# Patient Record
Sex: MALE | Race: WHITE | NOT HISPANIC OR LATINO | Employment: UNEMPLOYED | URBAN - METROPOLITAN AREA
[De-identification: names, ages, dates, MRNs, and addresses within clinical notes are randomized per-mention and may not be internally consistent; named-entity substitution may affect disease eponyms.]

---

## 2020-09-30 ENCOUNTER — MEDICAL CORRESPONDENCE (OUTPATIENT)
Dept: HEALTH INFORMATION MANAGEMENT | Facility: CLINIC | Age: 45
End: 2020-09-30

## 2020-09-30 ENCOUNTER — TRANSFERRED RECORDS (OUTPATIENT)
Dept: HEALTH INFORMATION MANAGEMENT | Facility: CLINIC | Age: 45
End: 2020-09-30

## 2020-10-09 ENCOUNTER — TRANSCRIBE ORDERS (OUTPATIENT)
Dept: OTHER | Age: 45
End: 2020-10-09

## 2020-10-09 DIAGNOSIS — R05.3 CHRONIC COUGH: Primary | ICD-10-CM

## 2021-07-10 ENCOUNTER — HEALTH MAINTENANCE LETTER (OUTPATIENT)
Age: 46
End: 2021-07-10

## 2021-09-04 ENCOUNTER — HEALTH MAINTENANCE LETTER (OUTPATIENT)
Age: 46
End: 2021-09-04

## 2022-05-18 ENCOUNTER — TELEPHONE (OUTPATIENT)
Dept: PSYCHIATRY | Facility: CLINIC | Age: 47
End: 2022-05-18

## 2022-05-18 NOTE — TELEPHONE ENCOUNTER
What is the concern that needs to be addressed by a nurse? New trd intake, interested in ect    May a detailed message be left on voicemail? yes    Date of last office visit: na    Message routed to: [facility] new referrals

## 2022-06-16 ENCOUNTER — TELEPHONE (OUTPATIENT)
Dept: BEHAVIORAL HEALTH | Facility: CLINIC | Age: 47
End: 2022-06-16
Payer: MEDICARE

## 2022-06-16 NOTE — TELEPHONE ENCOUNTER
Visit Activities (Refresh list every visit): Phone Encounter     Anthony's mother called me and left a voicemail last week asking for a call back about possibel resources for mental health support in our system. I called her back and spoke to her for about 20 minutes. Explained resources within our system, provided support and encouragement for working with and for her son. He is not actively suicidal per her report, but she is hoping to get him into the [facility] or something similar for his depression. She may look into [place]. He actively sees his psychiatrist at [facility], and she says they will consult with him about [place], as well.    I did encouraged her to take him to the ED if anything becomes more difficult for him or SI appears. She will be back in touch with me if I can be of further help, but there are no further actions planned with me at this time.        Keaton Agudelo, GEOFFREY, [facility]

## 2022-08-06 ENCOUNTER — HEALTH MAINTENANCE LETTER (OUTPATIENT)
Age: 47
End: 2022-08-06

## 2022-10-07 ENCOUNTER — OFFICE VISIT (OUTPATIENT)
Dept: PSYCHIATRY | Facility: CLINIC | Age: 47
End: 2022-10-07
Payer: MEDICAID

## 2022-10-07 VITALS
TEMPERATURE: 98.7 F | HEIGHT: 74 IN | WEIGHT: 282.2 LBS | HEART RATE: 108 BPM | DIASTOLIC BLOOD PRESSURE: 86 MMHG | SYSTOLIC BLOOD PRESSURE: 122 MMHG | BODY MASS INDEX: 36.22 KG/M2

## 2022-10-07 DIAGNOSIS — F41.1 GENERALIZED ANXIETY DISORDER: ICD-10-CM

## 2022-10-07 DIAGNOSIS — G47.00 INSOMNIA, UNSPECIFIED TYPE: ICD-10-CM

## 2022-10-07 DIAGNOSIS — F10.21 ALCOHOL USE DISORDER, MODERATE, IN SUSTAINED REMISSION, DEPENDENCE (H): ICD-10-CM

## 2022-10-07 DIAGNOSIS — S06.9X3S TRAUMATIC BRAIN INJURY, WITH LOSS OF CONSCIOUSNESS OF 1 HOUR TO 5 HOURS 59 MINUTES, SEQUELA (H): ICD-10-CM

## 2022-10-07 DIAGNOSIS — F32.3 MAJOR DEPRESSIVE DISORDER, SINGLE EPISODE, SEVERE WITH PSYCHOTIC FEATURES (H): Primary | ICD-10-CM

## 2022-10-07 DIAGNOSIS — E55.9 VITAMIN D DEFICIENCY: ICD-10-CM

## 2022-10-07 ASSESSMENT — PATIENT HEALTH QUESTIONNAIRE - PHQ9: SUM OF ALL RESPONSES TO PHQ QUESTIONS 1-9: 18
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
## 2022-10-07 NOTE — PATIENT INSTRUCTIONS
Today's Recommendations:  - ECT:  Go to your primary care MD for an ECT pre-op exam ASAP.  You could take this print out to your doctor to show that you need : EKG, CBC, BMP, and  pre-op assessment. The pre-op assessment should include a physical examination, including neurological examination and evaluation of potential risks for ECT and anesthesia.    Unless your clinic is in the [facility] system (where we can see their notes), ask the doctor's office to fax the evaluation and lab results, including EKG tracing to [phone] to ATTN: Dr.Gamze Arriaza. When your doctor's office has sent the information call our team nurse at [phone] to make sure they were all received. As soon as we have medical clearance and insurance approval, we will schedule first ECT.    During ECT, you may call the ECT area [phone] between 7 AM and 2 PM on Monday, Wednesday and Friday about scheduling issues. At other times, you will have to leave a voice message which will be retrieved the next ECT day.       Useful Tips: 1- You may start using a calendar and notebook or apps on your smartphone to keep track of appointments, conversations, and other things you need to remember, as this will be more helpful as the ECT continues 2- An ECT journal to track your progress: Spend a few minutes writing down how you feel now before ECT and why you are doing ECT. Throughout the course of treatments (probably this will be easier on non-ECT days), write what changes you are noticing in your depression, daily activities or side effects of treatment. This can be useful later on in the ECT if you are having some memory loss day-to-day and can't recall how you used to feel. If you have trouble writing this, try recording a video on non-ECT days describing the above. 3- Improve Self- Care: eat healthily, keep a regular sleep schedule, exercise or other physical activity at least on non-ECT days as able. 4- Keep mentally active by reading, doing  crossword puzzles or other word games, play video or other games. You can improve your memory for events happening over the previous few days by regularly reviewing things that happened over that period of time, refreshing your memory.     Medication changes before and/or during ECT:     1: Do not take these medications the evening before and morning of (if applicable) ECT is planned: Clonazepam     For further information about ECT:     https://Good Seed.be/ZYCjz-6iEW4              https://www.[employer]ass Mobile[employer].com/watch?v=TAxt5ik6fdq      https://www.youJedox AGube.com/watch?v=JxeH75VA2Nu  https://www.isen-ect.org/educational-content  http://www.[facility].org/tests-procedures/electroconvulsive-therapy/basics/definition/prc-86982760              - Our recommendations almost always include some kind of cognitive-behavioral therapy (CBT) if you are not getting it already. Brain and nerve stimulation treatments work best when combined with certain psychotherapy methods. We make these recommendations because we strongly believe that, without the therapy piece, most people will not get better, or will have limited clinical benefit. It is often difficult or inconvenient to add therapy to an already busy schedule, but it is also necessary. Here is a link to find a CBT provider:  https://services.abct.org/i4a/memberDirectory/index.cfm?directory_id=3&bohfJL=1352    It is important to remember that, like all mental health treatments, our interventional therapies are not perfect. About one third of people will not feel better after treatment, and even when they work, they do not take away the symptoms entirely. If we have recommended something above, it means we think there is a better than even chance of it working, but things are never guaranteed. This is another reason we usually recommend CBT along with our advanced treatments -- it can address the symptoms that remain after the stimulation/ketamine treatment.       We are specifically a  "[place] and research [facility], and there are often research studies ongoing. Some of those are clinical trials of new brain stimulation treatments. Others are what we would call \"biomarker\" studies, where we ask you to participate in some kind of measurement while you are undergoing regular treatment. You may be called by one of our clinical research coordinators about these studies. If you do not want to be contacted, please let us know. (Being called does not mean you have to be in a study.) In some cases, a clinical trial is the only way to get access to an advanced treatment that is not covered by your insurance. Usually, we will talk about this in your visit if it is an option.    Outside of this specific clinic, you might also be interested in the \"Measurement Based Care\" research study that is being conducted throughout the [facility] Department of Psychiatry and Behavioral Sciences. This provides some additional testing that might be diagnostically helpful, although we are still trying to learn how best to use it.  More information about that is at: https://[email or web address].[email or web address]/behavioral-measurement-based-care-psychiatry-bmcp-poster    If you are interested in the study you can email, [email or web address].    Patient Education       General Information:  Our Treatment-Resistant Disorders/Interventional Psychiatry clinic is what is often called a \"consultation\" or \"tertiary care\" clinic. That means we do not see patients long-term for medication management or talk therapy. We offer thorough evaluations, recommendations about medications/therapies you may have not yet tried, and in some cases, brain stimulation or office-based treatments. If you are likely to benefit from one of those advanced treatments, we will have talked about it today. Once that treatment is complete, we will see you once or twice afterwards to check in, and then you will return to getting ongoing psychiatric care from whomever you were seeing " before you came for your evaluation with us. If for some reason you no longer have access to that clinician, we can help with a referral to our main [facility]. The only patients we see long-term are patients with implanted medical devices that require ongoing monitoring and programming.       While we are waiting to implement the recommendations you and I have discussed, you should know what to do if your symptoms worsen. A variety of crisis numbers/resources are available. They include:    CRISIS GENERAL NUMBERS   8-102-BJNAPEM ([phone])  OR  911     CRISIS INTERVENTION TEAM (CIT) - this is a POLICE UNIT, specially trained.  This website has information for all of [place]'s CITs. Lays out which areas have this team.  Http://cit.[facility]/citmap/[place].php  However, one can just call 911 and ask for this special team.   If police need to be called, DO ask for this team.    CRISIS MOBILE TEAMS  [and see end of this phrase*]  [facility] -COPE: 24hrs/7days:    [phone]  (Adults > 19yo)    [phone]  (Child   < 18yo)                                       FRONT DOOR (during the day could call)   [phone]    [facility] -[phone] (Adult)  [phone] (Child)     [facility] -[phone] (Adult and Child)     [facility] -[phone] ([employer] mobile crisis team; Adult and Child; 24hr)     [facility] -[phone] (Adult and Child)     CRISIS HOUSING  [facility]                           [address]              [phone]  [facility]                                [address]                       [phone]  [facility]                               [address]     [phone]   [facility]  [address]                [phone]    [place] EMERGENCY NUMBERS    Crisis  "Connection:                                [phone]  [facility]:     [phone]  Crisis Intervention:                                [phone] or [phone]   COPE: Mobile Team 24hrs/7days:    [phone]  (Adults > 19yo)                                                                            [phone]  (Child   < 16yo)  [facility]        [phone] 24/7 line and Mobile Team   [address] [place] [address]  DROP IN:  M-F: 8:00 am - 7:00 pm  Sat: 11:00 am - 3:00 pm   Sun: Closed     WALK-IN COUNSELING:  [facility]       [phone]    [address]:   M, W, F:  1:00-3:00PM    M-Th:  6:30-8:30PM    TRANS and LGBT  Call [employer] at [phone]. This service is staffed by trans people 24/7.   LGBT youth <24 contemplating suicide, call the lifecake [phone].    POISON CONTROL CENTER  [phone]    OR  go to nearest ER    CHILD  \"[facility] has a needs assessment team who will do an intake evaluation. Based on the results of the intake they will recommended inpatient admission, partial hospitalization, intensive outpatient or outpatient care. The number is [phone]. \"    CRISIS TEXT LINE  Http://www.crisistextline.org  FREE SUPPORT AT YOUR FINGERTIPS, 24/7  Crisis Text Line serves anyone, in any type of crisis, providing access to free, 24/7 support and information via the medium people already use and trust: text. Here s how it works:  1)  Text [phone] from anywhere in the [place], anytime, about any type of crisis.  2)  A live, trained Crisis Counselor receives the text and responds quickly.      The volunteer Crisis Counselor will help you move from a 'hot moment to a cool moment'    [facility] EMERGENCY NUMBERS    Crisis Connection:                                [phone]  [facility]:              [phone]  Crisis Intervention:                          " "      [phone] or [phone]   COPE: Mobile Team 24hrs/7days:    [phone]  (Adults > 17yo)                                                                            [phone]  (Child   < 18yo)  [facility]        [phone]  CALL 24 hours a day.  [address] [place] [address]  DROP IN:  M-F: 8:00 am - 7:00 pm  Sat: 11:00 am - 3:00 pm   Sun: Closed    WALK-IN COUNSELIN)  [facility]                                  [phone]        [facility], [address] [place] [address] Ave:                  M, W:      5:00-7:00PM       2)  [facility]                            [phone]        [facility], [address] [place] [facility] [place]                T, Th:      6:00-8:00PM    TRANS and LGBT  Call [employer] at [phone]. This service is staffed by trans people .   LGBT youth <24 contemplating suicide, call the [employer] [phone].    POISON CONTROL CENTER  [phone]    OR  go to nearest ER    CHILD  \"[facility] has a needs assessment team who will do an intake evaluation. Based on the results of the intake they will recommended inpatient admission, partial hospitalization, intensive outpatient or outpatient care. The number is [phone] or [phone]. \"    CRISIS TEXT LINE  Http://www.crisistextline.org  FREE SUPPORT AT YOUR FINGERTIPS,   Crisis Text Line serves anyone, in any type of crisis, providing access to free,  support and information via the medium people already use and trust: text. Here s how it works:  1)  Text [phone] from anywhere in the [place], anytime, about any type of crisis.  2)  A live, trained Crisis Counselor receives the text and responds quickly.      The volunteer Crisis Counselor will help you move from a 'hot moment to a cool moment'      * A Community Paramedic (CP) is an advanced paramedic that works to increase access to primary and preventive care and decrease use of emergency departments, which in turn " decreases health care costs. Among other things, CPs may play a key role in providing follow-up services after a hospital discharge to prevent hospital readmission. CPs can provide health assessments, chronic disease monitoring and education, medication management, immunizations and vaccinations, laboratory specimen collection, hospital discharge follow-up care and minor medical procedures. CPs work under the direction of an Ambulance Medical Director.

## 2023-01-23 ENCOUNTER — HOSPITAL ENCOUNTER (OUTPATIENT)
Facility: CLINIC | Age: 48
Setting detail: OBSERVATION
Discharge: STILL ADMITTED OR ADMINISTRATIVE | End: 2023-01-26
Attending: EMERGENCY MEDICINE | Admitting: NURSE PRACTITIONER
Payer: MEDICARE

## 2023-01-23 DIAGNOSIS — F41.9 ANXIETY: ICD-10-CM

## 2023-01-23 DIAGNOSIS — F25.0 SCHIZOAFFECTIVE DISORDER, BIPOLAR TYPE (H): ICD-10-CM

## 2023-01-23 DIAGNOSIS — F22 PARANOIA (H): ICD-10-CM

## 2023-01-23 LAB — SARS-COV-2 RNA RESP QL NAA+PROBE: NEGATIVE

## 2023-01-23 PROCEDURE — 99285 EMERGENCY DEPT VISIT HI MDM: CPT | Mod: 25

## 2023-01-23 PROCEDURE — U0005 INFEC AGEN DETEC AMPLI PROBE: HCPCS | Performed by: EMERGENCY MEDICINE

## 2023-01-23 PROCEDURE — 250N000013 HC RX MED GY IP 250 OP 250 PS 637: Performed by: NURSE PRACTITIONER

## 2023-01-23 PROCEDURE — G0378 HOSPITAL OBSERVATION PER HR: HCPCS

## 2023-01-23 PROCEDURE — 90791 PSYCH DIAGNOSTIC EVALUATION: CPT

## 2023-01-23 PROCEDURE — C9803 HOPD COVID-19 SPEC COLLECT: HCPCS

## 2023-01-23 PROCEDURE — 99223 1ST HOSP IP/OBS HIGH 75: CPT | Mod: 25 | Performed by: NURSE PRACTITIONER

## 2023-01-23 PROCEDURE — 93005 ELECTROCARDIOGRAM TRACING: CPT

## 2023-01-23 RX ORDER — ISOSORBIDE MONONITRATE 30 MG/1
1 TABLET, EXTENDED RELEASE ORAL DAILY
Status: ON HOLD | COMMUNITY
Start: 2022-11-23 | End: 2023-02-08

## 2023-01-23 RX ORDER — ZIPRASIDONE HYDROCHLORIDE 40 MG/1
40 CAPSULE ORAL
Status: DISCONTINUED | OUTPATIENT
Start: 2023-01-23 | End: 2023-01-26 | Stop reason: HOSPADM

## 2023-01-23 RX ORDER — LORATADINE 10 MG/1
10 TABLET ORAL DAILY
Status: DISCONTINUED | OUTPATIENT
Start: 2023-01-24 | End: 2023-01-26 | Stop reason: HOSPADM

## 2023-01-23 RX ORDER — ZIPRASIDONE HYDROCHLORIDE 80 MG/1
80 CAPSULE ORAL DAILY
Status: DISCONTINUED | OUTPATIENT
Start: 2023-01-24 | End: 2023-01-26 | Stop reason: HOSPADM

## 2023-01-23 RX ORDER — FLUTICASONE FUROATE AND VILANTEROL 100; 25 UG/1; UG/1
2 POWDER RESPIRATORY (INHALATION) 2 TIMES DAILY
Status: DISCONTINUED | OUTPATIENT
Start: 2023-01-24 | End: 2023-01-26 | Stop reason: HOSPADM

## 2023-01-23 RX ORDER — MIRTAZAPINE 15 MG/1
15 TABLET, FILM COATED ORAL AT BEDTIME
Status: DISCONTINUED | OUTPATIENT
Start: 2023-01-23 | End: 2023-01-26 | Stop reason: HOSPADM

## 2023-01-23 RX ORDER — LANOLIN ALCOHOL/MO/W.PET/CERES
3 CREAM (GRAM) TOPICAL
Status: DISCONTINUED | OUTPATIENT
Start: 2023-01-23 | End: 2023-01-26 | Stop reason: HOSPADM

## 2023-01-23 RX ORDER — ZIPRASIDONE HYDROCHLORIDE 80 MG/1
80 CAPSULE ORAL 2 TIMES DAILY
Status: DISCONTINUED | OUTPATIENT
Start: 2023-01-23 | End: 2023-01-23

## 2023-01-23 RX ORDER — FLUTICASONE FUROATE AND VILANTEROL TRIFENATATE 100; 25 UG/1; UG/1
1 POWDER RESPIRATORY (INHALATION) DAILY
Status: ON HOLD | COMMUNITY
Start: 2022-12-08 | End: 2023-02-08

## 2023-01-23 RX ORDER — METFORMIN HCL 500 MG
1500 TABLET, EXTENDED RELEASE 24 HR ORAL EVERY MORNING
Status: DISCONTINUED | OUTPATIENT
Start: 2023-01-24 | End: 2023-01-26 | Stop reason: HOSPADM

## 2023-01-23 RX ORDER — LANOLIN ALCOHOL/MO/W.PET/CERES
3 CREAM (GRAM) TOPICAL
COMMUNITY

## 2023-01-23 RX ORDER — HYDROXYZINE HYDROCHLORIDE 25 MG/1
25 TABLET, FILM COATED ORAL 3 TIMES DAILY PRN
Status: ON HOLD | COMMUNITY
Start: 2022-09-07 | End: 2023-02-08

## 2023-01-23 RX ORDER — LIDOCAINE 4 G/G
1 PATCH TOPICAL
Status: DISCONTINUED | OUTPATIENT
Start: 2023-01-24 | End: 2023-01-26 | Stop reason: HOSPADM

## 2023-01-23 RX ORDER — PROPRANOLOL HYDROCHLORIDE 20 MG/1
20 TABLET ORAL 3 TIMES DAILY
Status: ON HOLD | COMMUNITY
Start: 2023-01-20 | End: 2023-02-08

## 2023-01-23 RX ORDER — ZIPRASIDONE HYDROCHLORIDE 80 MG/1
80 CAPSULE ORAL 2 TIMES DAILY
Status: ON HOLD | COMMUNITY
Start: 2023-01-09 | End: 2023-02-08

## 2023-01-23 RX ORDER — LORAZEPAM 1 MG/1
1 TABLET ORAL EVERY 4 HOURS PRN
Status: DISCONTINUED | OUTPATIENT
Start: 2023-01-23 | End: 2023-01-26 | Stop reason: HOSPADM

## 2023-01-23 RX ORDER — HALOPERIDOL 5 MG/1
5 TABLET ORAL EVERY 6 HOURS PRN
Status: DISCONTINUED | OUTPATIENT
Start: 2023-01-23 | End: 2023-01-26 | Stop reason: HOSPADM

## 2023-01-23 RX ORDER — METFORMIN HCL 500 MG
1500 TABLET, EXTENDED RELEASE 24 HR ORAL EVERY MORNING
Status: ON HOLD | COMMUNITY
Start: 2022-09-28 | End: 2023-02-08

## 2023-01-23 RX ORDER — LITHIUM CARBONATE 300 MG/1
300 CAPSULE ORAL 2 TIMES DAILY WITH MEALS
Status: DISCONTINUED | OUTPATIENT
Start: 2023-01-23 | End: 2023-01-26 | Stop reason: HOSPADM

## 2023-01-23 RX ORDER — LAMOTRIGINE 100 MG/1
300 TABLET ORAL EVERY MORNING
Status: DISCONTINUED | OUTPATIENT
Start: 2023-01-24 | End: 2023-01-26 | Stop reason: HOSPADM

## 2023-01-23 RX ORDER — PROPRANOLOL HYDROCHLORIDE 10 MG/1
20 TABLET ORAL 3 TIMES DAILY
Status: DISCONTINUED | OUTPATIENT
Start: 2023-01-23 | End: 2023-01-26 | Stop reason: HOSPADM

## 2023-01-23 RX ORDER — MIRTAZAPINE 15 MG/1
15 TABLET, FILM COATED ORAL
Status: ON HOLD | COMMUNITY
Start: 2022-12-29 | End: 2023-02-08

## 2023-01-23 RX ORDER — ALBUTEROL SULFATE 90 UG/1
1-2 AEROSOL, METERED RESPIRATORY (INHALATION) EVERY 4 HOURS PRN
Status: ON HOLD | COMMUNITY
Start: 2022-04-20 | End: 2023-02-08

## 2023-01-23 RX ORDER — ISOSORBIDE MONONITRATE 30 MG/1
30 TABLET, EXTENDED RELEASE ORAL DAILY
Status: DISCONTINUED | OUTPATIENT
Start: 2023-01-24 | End: 2023-01-26 | Stop reason: HOSPADM

## 2023-01-23 RX ORDER — LIDOCAINE 50 MG/G
1 PATCH TOPICAL DAILY
COMMUNITY
Start: 2022-09-29

## 2023-01-23 RX ORDER — LAMOTRIGINE 150 MG/1
300 TABLET ORAL EVERY MORNING
Status: ON HOLD | COMMUNITY
Start: 2023-01-23 | End: 2023-02-08

## 2023-01-23 RX ORDER — HYDROXYZINE HYDROCHLORIDE 25 MG/1
25 TABLET, FILM COATED ORAL 3 TIMES DAILY PRN
Status: DISCONTINUED | OUTPATIENT
Start: 2023-01-23 | End: 2023-01-26 | Stop reason: HOSPADM

## 2023-01-23 RX ORDER — DILTIAZEM HYDROCHLORIDE 120 MG/1
120 CAPSULE, EXTENDED RELEASE ORAL DAILY
Status: ON HOLD | COMMUNITY
Start: 2022-05-10 | End: 2023-02-08

## 2023-01-23 RX ORDER — DILTIAZEM HYDROCHLORIDE 120 MG/1
120 CAPSULE, COATED, EXTENDED RELEASE ORAL DAILY
Status: DISCONTINUED | OUTPATIENT
Start: 2023-01-24 | End: 2023-01-26 | Stop reason: HOSPADM

## 2023-01-23 RX ORDER — MULTIVITAMIN
1 TABLET ORAL DAILY
COMMUNITY
Start: 2022-10-21

## 2023-01-23 RX ORDER — LORATADINE 10 MG/1
10 CAPSULE, LIQUID FILLED ORAL DAILY
Status: ON HOLD | COMMUNITY
End: 2023-02-08

## 2023-01-23 RX ORDER — ALBUTEROL SULFATE 90 UG/1
1-2 AEROSOL, METERED RESPIRATORY (INHALATION) EVERY 4 HOURS PRN
Status: DISCONTINUED | OUTPATIENT
Start: 2023-01-23 | End: 2023-01-26 | Stop reason: HOSPADM

## 2023-01-23 RX ADMIN — LORAZEPAM 1 MG: 1 TABLET ORAL at 21:48

## 2023-01-23 RX ADMIN — MIRTAZAPINE 15 MG: 15 TABLET, FILM COATED ORAL at 22:13

## 2023-01-23 RX ADMIN — HALOPERIDOL 5 MG: 5 TABLET ORAL at 21:48

## 2023-01-23 RX ADMIN — ZIPRASIDONE HYDROCHLORIDE 40 MG: 40 CAPSULE ORAL at 22:13

## 2023-01-23 RX ADMIN — PROPRANOLOL HYDROCHLORIDE 20 MG: 10 TABLET ORAL at 22:12

## 2023-01-23 ASSESSMENT — COLUMBIA-SUICIDE SEVERITY RATING SCALE - C-SSRS
TOTAL  NUMBER OF ACTUAL ATTEMPTS LIFETIME: 2
2. HAVE YOU ACTUALLY HAD ANY THOUGHTS OF KILLING YOURSELF?: YES
TOTAL  NUMBER OF ABORTED OR SELF INTERRUPTED ATTEMPTS LIFETIME: NO
4. HAVE YOU HAD THESE THOUGHTS AND HAD SOME INTENTION OF ACTING ON THEM?: YES
6. HAVE YOU EVER DONE ANYTHING, STARTED TO DO ANYTHING, OR PREPARED TO DO ANYTHING TO END YOUR LIFE?: NO
ATTEMPT PAST THREE MONTHS: NO
ATTEMPT LIFETIME: YES
TOTAL  NUMBER OF INTERRUPTED ATTEMPTS LIFETIME: NO
3. HAVE YOU BEEN THINKING ABOUT HOW YOU MIGHT KILL YOURSELF?: YES
1. IN THE PAST MONTH, HAVE YOU WISHED YOU WERE DEAD OR WISHED YOU COULD GO TO SLEEP AND NOT WAKE UP?: NO
5. HAVE YOU STARTED TO WORK OUT OR WORKED OUT THE DETAILS OF HOW TO KILL YOURSELF? DO YOU INTEND TO CARRY OUT THIS PLAN?: YES
1. HAVE YOU WISHED YOU WERE DEAD OR WISHED YOU COULD GO TO SLEEP AND NOT WAKE UP?: YES
REASONS FOR IDEATION PAST MONTH: COMPLETELY TO GET ATTENTION, REVENGE, OR A REACTION FROM OTHERS
5. HAVE YOU STARTED TO WORK OUT OR WORKED OUT THE DETAILS OF HOW TO KILL YOURSELF? DO YOU INTEND TO CARRY OUT THIS PLAN?: NO
2. HAVE YOU ACTUALLY HAD ANY THOUGHTS OF KILLING YOURSELF?: NO
4. HAVE YOU HAD THESE THOUGHTS AND HAD SOME INTENTION OF ACTING ON THEM?: NO
REASONS FOR IDEATION LIFETIME: COMPLETELY TO END OR STOP THE PAIN (YOU COULDN'T GO ON LIVING WITH THE PAIN OR HOW YOU WERE FEELING)

## 2023-01-23 ASSESSMENT — ACTIVITIES OF DAILY LIVING (ADL)
ADLS_ACUITY_SCORE: 35
ADLS_ACUITY_SCORE: 35

## 2023-01-23 ASSESSMENT — ENCOUNTER SYMPTOMS: NERVOUS/ANXIOUS: 1
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
## 2023-01-24 ENCOUNTER — TELEPHONE (OUTPATIENT)
Dept: BEHAVIORAL HEALTH | Facility: CLINIC | Age: 48
End: 2023-01-24
Payer: MEDICARE

## 2023-01-24 LAB
ALBUMIN SERPL BCG-MCNC: 3.9 G/DL (ref 3.5–5.2)
ALP SERPL-CCNC: 73 U/L (ref 40–129)
ALT SERPL W P-5'-P-CCNC: 17 U/L (ref 10–50)
AMPHETAMINES UR QL SCN: NORMAL
ANION GAP SERPL CALCULATED.3IONS-SCNC: 11 MMOL/L (ref 7–15)
AST SERPL W P-5'-P-CCNC: 14 U/L (ref 10–50)
ATRIAL RATE - MUSE: 77 BPM
BARBITURATES UR QL SCN: NORMAL
BENZODIAZ UR QL SCN: NORMAL
BILIRUB SERPL-MCNC: 0.5 MG/DL
BUN SERPL-MCNC: 11.1 MG/DL (ref 6–20)
BZE UR QL SCN: NORMAL
CALCIUM SERPL-MCNC: 8.6 MG/DL (ref 8.6–10)
CANNABINOIDS UR QL SCN: NORMAL
CHLORIDE SERPL-SCNC: 103 MMOL/L (ref 98–107)
CREAT SERPL-MCNC: 0.94 MG/DL (ref 0.67–1.17)
DEPRECATED HCO3 PLAS-SCNC: 27 MMOL/L (ref 22–29)
DIASTOLIC BLOOD PRESSURE - MUSE: NORMAL MMHG
ERYTHROCYTE [DISTWIDTH] IN BLOOD BY AUTOMATED COUNT: 14.1 % (ref 10–15)
GFR SERPL CREATININE-BSD FRML MDRD: >90 ML/MIN/1.73M2
GLUCOSE SERPL-MCNC: 94 MG/DL (ref 70–99)
HCT VFR BLD AUTO: 41.1 % (ref 40–53)
HGB BLD-MCNC: 14.2 G/DL (ref 13.3–17.7)
INTERPRETATION ECG - MUSE: NORMAL
MCH RBC QN AUTO: 29.9 PG (ref 26.5–33)
MCHC RBC AUTO-ENTMCNC: 34.5 G/DL (ref 31.5–36.5)
MCV RBC AUTO: 87 FL (ref 78–100)
OPIATES UR QL SCN: NORMAL
P AXIS - MUSE: 58 DEGREES
PCP QUAL URINE (ROCHE): NORMAL
PLATELET # BLD AUTO: 306 10E3/UL (ref 150–450)
POTASSIUM SERPL-SCNC: 3.7 MMOL/L (ref 3.4–5.3)
PR INTERVAL - MUSE: 160 MS
PROT SERPL-MCNC: 6.2 G/DL (ref 6.4–8.3)
QRS DURATION - MUSE: 88 MS
QT - MUSE: 418 MS
QTC - MUSE: 473 MS
R AXIS - MUSE: -2 DEGREES
RBC # BLD AUTO: 4.75 10E6/UL (ref 4.4–5.9)
SODIUM SERPL-SCNC: 141 MMOL/L (ref 136–145)
SYSTOLIC BLOOD PRESSURE - MUSE: NORMAL MMHG
T AXIS - MUSE: 25 DEGREES
T4 FREE SERPL-MCNC: 1.53 NG/DL (ref 0.9–1.7)
TSH SERPL DL<=0.005 MIU/L-ACNC: 4.4 UIU/ML (ref 0.3–4.2)
VENTRICULAR RATE- MUSE: 77 BPM
WBC # BLD AUTO: 8.7 10E3/UL (ref 4–11)

## 2023-01-24 PROCEDURE — 80053 COMPREHEN METABOLIC PANEL: CPT | Performed by: NURSE PRACTITIONER

## 2023-01-24 PROCEDURE — 84443 ASSAY THYROID STIM HORMONE: CPT | Performed by: NURSE PRACTITIONER

## 2023-01-24 PROCEDURE — 99233 SBSQ HOSP IP/OBS HIGH 50: CPT | Performed by: PSYCHIATRY & NEUROLOGY

## 2023-01-24 PROCEDURE — 85014 HEMATOCRIT: CPT | Performed by: NURSE PRACTITIONER

## 2023-01-24 PROCEDURE — 250N000013 HC RX MED GY IP 250 OP 250 PS 637: Performed by: NURSE PRACTITIONER

## 2023-01-24 PROCEDURE — G0378 HOSPITAL OBSERVATION PER HR: HCPCS

## 2023-01-24 PROCEDURE — 80307 DRUG TEST PRSMV CHEM ANLYZR: CPT | Performed by: EMERGENCY MEDICINE

## 2023-01-24 PROCEDURE — 84439 ASSAY OF FREE THYROXINE: CPT | Performed by: NURSE PRACTITIONER

## 2023-01-24 PROCEDURE — 36415 COLL VENOUS BLD VENIPUNCTURE: CPT | Performed by: NURSE PRACTITIONER

## 2023-01-24 RX ADMIN — LORAZEPAM 1 MG: 1 TABLET ORAL at 21:56

## 2023-01-24 RX ADMIN — PROPRANOLOL HYDROCHLORIDE 20 MG: 10 TABLET ORAL at 20:32

## 2023-01-24 RX ADMIN — HALOPERIDOL 5 MG: 5 TABLET ORAL at 21:56

## 2023-01-24 RX ADMIN — PROPRANOLOL HYDROCHLORIDE 20 MG: 10 TABLET ORAL at 09:21

## 2023-01-24 RX ADMIN — LITHIUM CARBONATE 300 MG: 300 CAPSULE, GELATIN COATED ORAL at 09:25

## 2023-01-24 RX ADMIN — ISOSORBIDE MONONITRATE 30 MG: 30 TABLET, EXTENDED RELEASE ORAL at 09:24

## 2023-01-24 RX ADMIN — LAMOTRIGINE 300 MG: 100 TABLET ORAL at 09:21

## 2023-01-24 RX ADMIN — METFORMIN ER 500 MG 1500 MG: 500 TABLET ORAL at 09:22

## 2023-01-24 RX ADMIN — LITHIUM CARBONATE 300 MG: 300 CAPSULE, GELATIN COATED ORAL at 19:11

## 2023-01-24 RX ADMIN — DILTIAZEM HYDROCHLORIDE 120 MG: 120 CAPSULE, COATED, EXTENDED RELEASE ORAL at 09:24

## 2023-01-24 RX ADMIN — LORATADINE 10 MG: 10 TABLET ORAL at 09:25

## 2023-01-24 RX ADMIN — MIRTAZAPINE 15 MG: 15 TABLET, FILM COATED ORAL at 20:32

## 2023-01-24 RX ADMIN — FLUTICASONE FUROATE AND VILANTEROL TRIFENATATE 2 PUFF: 100; 25 POWDER RESPIRATORY (INHALATION) at 20:31

## 2023-01-24 RX ADMIN — ZIPRASIDONE HCL 80 MG: 80 CAPSULE ORAL at 09:22

## 2023-01-24 RX ADMIN — FLUTICASONE FUROATE AND VILANTEROL TRIFENATATE 2 PUFF: 100; 25 POWDER RESPIRATORY (INHALATION) at 09:29

## 2023-01-24 RX ADMIN — ZIPRASIDONE HYDROCHLORIDE 40 MG: 40 CAPSULE ORAL at 19:11

## 2023-01-24 ASSESSMENT — COLUMBIA-SUICIDE SEVERITY RATING SCALE - C-SSRS
2. HAVE YOU ACTUALLY HAD ANY THOUGHTS OF KILLING YOURSELF?: YES
1. SINCE LAST CONTACT, HAVE YOU WISHED YOU WERE DEAD OR WISHED YOU COULD GO TO SLEEP AND NOT WAKE UP?: YES
6. HAVE YOU EVER DONE ANYTHING, STARTED TO DO ANYTHING, OR PREPARED TO DO ANYTHING TO END YOUR LIFE?: NO
REASONS FOR IDEATION SINCE LAST CONTACT: COMPLETELY TO END OR STOP THE PAIN (YOU COULDN'T GO ON LIVING WITH THE PAIN OR HOW YOU WERE FEELING)
TOTAL  NUMBER OF INTERRUPTED ATTEMPTS SINCE LAST CONTACT: NO
5. HAVE YOU STARTED TO WORK OUT OR WORKED OUT THE DETAILS OF HOW TO KILL YOURSELF? DO YOU INTEND TO CARRY OUT THIS PLAN?: NO
SUICIDE, SINCE LAST CONTACT: NO
ATTEMPT SINCE LAST CONTACT: NO
TOTAL  NUMBER OF ABORTED OR SELF INTERRUPTED ATTEMPTS SINCE LAST CONTACT: NO

## 2023-01-24 ASSESSMENT — ACTIVITIES OF DAILY LIVING (ADL)
ADLS_ACUITY_SCORE: 35

## 2023-01-24 NOTE — ED PROVIDER NOTES
EmPATH Unit - Psychiatric Consultation  [facility] Emergency Department    Tim Kelley MRN: [id number]   Age: 47 year old YOB: 1975     History     Chief Complaint   Patient presents with     Paranoid     Psychiatric Evaluation     HPI  Tim Kelley is a 47 year old male with history notable for schizoaffective disorder who is currently under observation status on the EmPATH unit for treatment of worsening psychosis involving paranoid delusions.  He was initially evaluated by CHILO Azevedo who lowered the dose of Geodon, suspecting EPS at the current dose after it was introduced during his last hospital stay.  Lithium was also added to address mood instability.  The patient was entered to observation status and is being reassessed today.  Overnight, there were no acute issues.    On examination today, the patient reports that the intensity of his anxiety remains high although slightly improved from yesterday.  He continues to harbor paranoid delusions that organizations are conspiring against him however the intensity of those thoughts and the associated anxiety he experiences is slightly less.  He is tolerating lithium without side effects.  He has had a dose of Haldol and gained benefit.  He slept well last night.  He is fearful of returning home today while endorsing passive suicidal thoughts if this level of symptom intensity were to persist.  He is seeking help to lessen the symptoms by pursuing inpatient psychiatric hospitalization again.    Past Medical History  Past Medical History:   Diagnosis Date     BIPOLAR DISORDER, DEPRESSIVE PHASE 8/6/2007     ED (erectile dysfunction)      Impulse control disorder, unspecified      Migraine, unspecified, without mention of intractable migraine without mention of status migrainosus      Other convulsions     EARLY CHILDHOOD     Past Surgical History:   Procedure Laterality Date     [facility] NONSPECIFIC PROCEDURE      HAND SUGERIES  "    albuterol (PROAIR HFA/PROVENTIL HFA/VENTOLIN HFA) 108 (90 Base) MCG/ACT inhaler  BREO ELLIPTA 100-25 MCG/ACT inhaler  diltiazem ER (DILT-XR) 120 MG 24 hr capsule  hydrOXYzine (ATARAX) 25 MG tablet  isosorbide mononitrate (IMDUR) 30 MG 24 hr tablet  lamoTRIgine (LAMICTAL) 150 MG tablet  lidocaine (LIDODERM) 5 % patch  loratadine 10 MG capsule  melatonin 3 MG tablet  metFORMIN (GLUCOPHAGE XR) 500 MG 24 hr tablet  mirtazapine (REMERON) 15 MG tablet  Multiple Vitamin (ONE-A-DAY ESSENTIAL) TABS  propranolol (INDERAL) 20 MG tablet  ziprasidone (GEODON) 80 MG capsule      Allergies   Allergen Reactions     No Known Drug Allergies      Family History  Family History   Problem Relation Age of Onset     Hypertension Paternal Grandmother      Hypertension Paternal Uncle      Hypertension Paternal Uncle      Depression Maternal Grandfather      Heart Disease Paternal Grandfather         several heart surgeries     C.A.D. Father         PTCA with 2 stents     Lipids Father      Social History   Social History     Tobacco Use     Smoking status: Never     Smokeless tobacco: Never   Substance Use Topics     Alcohol use: Yes     Comment: 2-3 per week     Drug use: No          Review of Systems  A medically appropriate review of systems was performed with pertinent positives and negatives noted in the HPI, and all other systems negative.    Physical Examination   BP: (!) 158/95  Pulse: 82  Temp: 98.5  F (36.9  C)  Resp: 18  Height: 188 cm (6' 2\")  Weight: 114.8 kg (253 lb)  SpO2: 97 %    Physical Exam  General: Appears stated age.   Neuro: Alert and fully oriented. Extremities appear to demonstrate normal strength on visual inspection.   Integumentary/Skin: no rash visualized, normal color    Psychiatric Examination   Appearance: awake, alert  Attitude:  cooperative  Eye Contact:  fair  Mood:  anxious  Affect:  tense and anxious  Speech:  clear, coherent  Psychomotor Behavior:  tremor observed   Thought Process:  " linear  Associations:  no loose associations  Thought Content:  passive suicidal ideation present and paranoid delusions are evident  Insight:  limited  Judgement:  fair  Oriented to:  time, person, and place  Attention Span and Concentration:  fair  Recent and Remote Memory:  fair  Language: able to name/identify objects without impairment  Fund of Knowledge: intact with awareness of current and past events    ED Course        Labs Ordered and Resulted from Time of ED Arrival to Time of ED Departure   COMPREHENSIVE METABOLIC PANEL - Abnormal       Result Value    Sodium 141      Potassium 3.7      Chloride 103      Carbon Dioxide (CO2) 27      Anion Gap 11      Urea Nitrogen 11.1      Creatinine 0.94      Calcium 8.6      Glucose 94      Alkaline Phosphatase 73      AST 14      ALT 17      Protein Total 6.2 (*)     Albumin 3.9      Bilirubin Total 0.5      GFR Estimate >90     TSH WITH FREE T4 REFLEX - Abnormal    TSH 4.40 (*)    COVID-19 VIRUS (CORONAVIRUS) BY PCR - Normal    SARS CoV2 PCR Negative     CBC WITH PLATELETS - Normal    WBC Count 8.7      RBC Count 4.75      Hemoglobin 14.2      Hematocrit 41.1      MCV 87      MCH 29.9      MCHC 34.5      RDW 14.1      Platelet Count 306     T4 FREE - Normal    Free T4 1.53     DRUG ABUSE SCREEN 77 URINE (FL, RH, SH) - Normal    Amphetamines Urine Screen Negative      Barbituates Urine Screen Negative      Benzodiazepine Urine Screen Negative      Cannabinoids Urine Screen Negative      Opiates Urine Screen Negative      PCP Urine Screen Negative      Cocaine Urine Screen Negative         Assessments & Plan (with Medical Decision Making)   Patient presenting with worsening psychosis involving paranoid delusions leading to a heightened state of anxiety.  His presentation is thought to have been complicated by side effects to Geodon, leading to a dose reduction of 40 mg.  Lithium was recently added while awaiting therapeutic response.  Given the high intensity of  psychosis and associated distress which may increase his suicide risk, we will proceed with admission to inpatient psychiatry.  Nursing notes reviewed noting no acute issues.     I have reviewed the assessment completed by the [facility].     Preliminary diagnosis:    ICD-10-CM    1. Paranoia (H)  F22       2. Schizoaffective disorder, bipolar type (H)  F25.0       3. Anxiety  F41.9            Treatment Plan:  -Continue the lower dose of Geodon at 120 mg daily in 2 divided doses targeting reduction of psychosis  -Continue Haldol as needed for acute worsening of psychosis until the scheduled medications provide adequate relief of symptoms.  -Continue lithium 300 mg twice a day for mood stabilization.  Anticipate checking a serum level in about 5 days to ensure safe and therapeutic dosing.  -Urine drug screen was reviewed and negative for illicit substances.  -Given the high intensity of psychosis and associated distress which may increase his suicide risk, we will proceed with admission to inpatient psychiatry under voluntary status.     --  Marcos Heard MD   [facility] EMERGENCY DEPT  EmPATH Unit  1/23/2023      Marcos Heard MD  01/24/23 1129
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
## 2023-01-24 NOTE — ED NOTES
"47 year old male received to [facility] in a very agitated-delusional state. Pressured and tense as he reports that \"hackers\" that he has \"pissed off\" are torturing him by putting energy into him from a \"Satellight\". Reports having bursts of heat all over his body, heart racing and penis retraction.\"They are going to kill me\" He reports that he is here have help to make amends with hackers so they will stop torturing him. \" Everyone thinks I am crazy but I am not-I need someone to believe me\"  Very anxious and desperate. Diaphoretic as he talks about his distress. Denies suicidal ideation but reports that at times he wishes he would die so he could escape the torture. Blood pressure and pulse stable. Offered support and reassurance. Offered lunch box and snacks. Will meet with LMHP and psychiatric provider.    Nursing and risk assessments completed.  Assessments reviewed with LMHP and physician. Video monitoring in progress, patient informed.  Admission information reviewed with patient. Patient given a tour of [facility] and instructions on using the facility. Questions regarding [facility] addressed. Pt search completed.  "
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
## 2023-01-24 NOTE — TELEPHONE ENCOUNTER
S: MANJULA Orozco  Elmer calling at 11:20 AM  about a 47 year old/Male presenting to ED from group home for evaluation of paranoid delusions and severe anxiety, with Increased paranoia, delusionalthinking that hackers are spying on him, not eating, sleeping and SI if no one can help him with hackers. Pt has a Hx of schizoaffective disorder, bipolar type, paranoid delusions, hx of stimulant use disorder, HTN, migraines.        B: Pt arrived via Group Home Staff . Presenting problem, stressors: delusions, paranoia, not sleeping or eating.    Pt affect in ED: Anxious   Pt Dx: Bipolar Disorder and Schizoaffective Disorder  Previous [facility] hx? Yes: [facility] in December    Pt endorses SI, no plan   Hx of suicide attempt? No  Pt denies SIB  Pt denies HI   Pt denies hallucinations .     Hx of aggression/violence, sexual offences, legal concerns, or Epic care plan? describe: No  Current concerns for aggression this visit? No  Does pt have a history of Civil Commitment? No  Is Pt their own guardian? Yes    Pt is prescribed medication. Is patient medication compliant? Yes  Pt endorses OP services: Psychiatrist, Medication Management, Therapist and   CD concerns: Hx of Amphetamines use, last use in december, 2022.  Acute or chronic medical concerns: None  Does Pt present with specific needs, assistive devices, or exclusionary criteria? None      Pt is ambulatory  Pt is able to perform ADLs independently      A: Pt to be reviewed for [facility] admission. Pt is Voluntary  Preferred placement: [facility]    COVID:Not yet ordered, Intake to request lab   Utox: Negative   CMP: WNL  CBC: WNL  HCG: N/A    R: Patient cleared and ready for behavioral bed placement: Yes  Pt placed on IP worklist? Yes

## 2023-01-24 NOTE — ED PROVIDER NOTES
"  History     Chief Complaint:  Paranoid and Psychiatric Evaluation       HPI   Tim Kelley is a 47 year old male with a history of paranoia with psychosis, schizoaffective disorder, and bipolar disorder who presents with paranoid and psychiatric evaluation. The patient reports that he upset a community of hackers that are now sending \"harmful energy\" to his body. He states that his phone was hacked in 2015 and he was unable to use any of his applications, and he sent himself an email that said \"You took my wedding pictures away from me. When I find you I'm going to fuck your daughter and wife in front of you, and then I'm going to slit your throat\" assuming that the hackers would read it. He went to the [employer] department, and the  said that his life may not be the same after sending that email. He states people began showing up and standing at the front of his house in 2016, and the hackers send harmful energy to him that causes heart racing, his penis turns white and goes inside of him, his testicles shrivel up, pain in his joints, and his entire body vibrates. The vibrations cause the house to creak and books to fall off the shelf. This used to happen once a month, but it has been happening every day recently. He feels safer when he is in the hospital, but states the hackers are still able to put harmful energy in him. He has been hospitalized multiple times, but no one believes that he is truly in trouble. He thinks he needs the help of the [employer] cyber crime unit. No current symptoms.     Review of External Notes: reviewed recent psych admission to [facility] 12/29/2022     ROS:  Review of Systems   Psychiatric/Behavioral: The patient is nervous/anxious.    All other systems reviewed and are negative.      Allergies:  No known drug allergies     Medications:    Albuterol inhaler  Imdur  Finasteride  Mirtazapine  Hydroxyzine  Lamotrigine  Propranolol  Geodon  Metformin " "    Past Medical History:   Bipolar disorder  Impulse control disorder  Migraines  Stimulant use disorder  Generalized anxiety disorder  Schizoaffective disorder  Psychoactive substance-induced organic delirium  Paranoia with psychosis  Hypertension  ADHD  Pathological gambling  Adrenal mass    Past Surgical History:    Hand surgery    Family History:    Father- CAD, hyperlipidemia, bipolar disorder  Mother- depression  Sister- borderline personality disorder    Social History:  The patient presents via private vehicle  PCP: Lonny Wright     Physical Exam     Patient Vitals for the past 24 hrs:   BP Temp Temp src Pulse Resp SpO2 Height Weight   01/23/23 1948 125/87 100  F (37.8  C) Oral 88 -- 97 % -- --   01/23/23 1947 -- -- -- -- -- -- 1.88 m (6' 2\") 114.8 kg (253 lb)   01/23/23 1600 (!) 158/95 98.5  F (36.9  C) Oral 82 18 97 % -- --        Physical Exam  General: Alert, No distress. Nontoxic appearance  Head: No signs of trauma.   Mouth/Throat: Oropharynx moist.   Eyes: Conjunctivae are normal. Pupils are equal..   Neck: Normal range of motion.    CV: Appears well perfused.  Resp:No respiratory distress.   MSK: Normal range of motion. No obvious deformity.   Neuro: The patient is alert and interactive. NORMAN. Speech normal. GCS 15  Skin: No lesion or sign of trauma noted.   Psych: normal mood and affect. behavior is normal.     Emergency Department Course     Laboratory:  Labs Ordered and Resulted from Time of ED Arrival to Time of ED Departure   COVID-19 VIRUS (CORONAVIRUS) BY PCR - Normal       Result Value    SARS CoV2 PCR Negative        Emergency Department Course & Assessments:    PSS-3    Date and Time Over the past 2 weeks have you felt down, depressed, or hopeless? Over the past 2 weeks have you had thoughts of killing yourself? Have you ever attempted to kill yourself? When did this last happen? User   01/23/23 1607 yes no yes more than 6 months ago SH              Suicide assessment completed by " mental health (D.E.C., LCSW, etc.)    Interventions:  Medications - No data to display     Social Determinants of Health affecting care:  Stress/Adjustment Disorders    Assessments:  1930 I obtained history and examined the patient as noted above. Plan for Em[facility].    Disposition:  The patient was transferred to [facility].     Impression & Plan      Medical Decision Making:  This patient is presenting to the ER with recurrent paranoid thoughts of people trying to harm him.  He was recently admitted to [facility] for similar thoughts.  No apparent organic causes of these thoughts is apparent.  The patient is medically cleared for transfer to the empath unit for further evaluation.    Diagnosis:    ICD-10-CM    1. Paranoia (H)  F22          Scribe Disclosure:  I, Dominga Del Rio, am serving as a scribe at 7:29 PM on 1/23/2023 to document services personally performed by Hollis Roberts MD based on my observations and the provider's statements to me.     1/23/2023   Hollis Roberts MD Joing, Todd Roger, MD  01/24/23 0417

## 2023-01-24 NOTE — ED NOTES
"[facility] Crisis Reassessment      Tim Kelley was reassessed for the following reasons: [facility] Pt was first seen on 01/23/2023 by ODALIS Morse; see the initial assessment note for details.      Patient Presentation    Initial ED presentation details:   Writer met with patient in consult room B for mental health crisis assessment. Patient came to this interview willingly. He was alert & oriented. Patient appeared physically agitated and spoke with loud, hyperverbal speech. Patient got quite worked up as he spoke about being frightened for his safety due to hackers ruining his life, however he responded well to redirection, encouragement to lower his voice, and calm his body. Patient reported that in 2016 his [employer] ID and all online accounts were hacked. Patient sent an e-mail to himself around this time, which he says was intended for and read by the hackers, threatening to rape the hackers' daughters and wives and kill the hacker. Patient is remorseful over sending this email and requests help from hospital staff to \"make amends\" with the hackers, as ever since this time they have been trying to kill him. Patient states several times during this interview how he is sure writer does not believe his story, as no one ever believes him because it sounds so outrageous.      Patient states his current symptoms became much worse on 12/22/2022 but he does not report any specific trigger on this date. Patient explained his symptoms as energy on his body becoming out of control and painful and feeling very hot, with a prickly sensation like fingers are touching him. He also states his heart races, all saliva leaves his mouth, and his penis and testicles completely retract into his body. Patient is quite animated as he discusses his symptoms. Patient denies any thoughts of suicide or of harming himself, however he mentions to writer \"I don't know if I'm going to kill myself\" due to " "being afraid for his life. Patient denies thoughts of harming other people, auditory hallucinations, or visual hallucinations. Patient reports that his sleep is poor and he is mostly tossing and turning at night. His appetite is very poor and he is frequently sick to his stomach \"because I'm so scared and nervous.\" Patient stated to writer \"I need safety, I don't know where to go, I don't know if I'm going to kill myself, I need to talk to the FBI, I need to get across to these people how sorry I am.\" When asked how the hospital can help him, patient replied \"put me on meds that will help calm me down, keep me safe from killing myself, and connect me with someone from the FBI.\" Writer informed patient that our goal at the hospital is to treat his mental health and that staff do not get involved in patients' personal or legal affairs, to which patient verbalized understanding. Patient is requesting to be admitted to an inpatient mental health unit.     Current patient presentation:   Pt presented to the reassessment willingly and was cooperative with Writer. When prompted by Writer to discuss how Pt is doing, Pt stated 'the energy hurts the fuck out of me.\" When Writer asked to further explain the energy, Pt shared he continues to be followed, tracked, and monitored by the \"hackers\" while on the EmPATH unit, yet acknowledges a decrease in severity. Pt continues to endorse feeling \"scared, very scared\" about his safety if he were to discharge and feels as though he requires inpatient mental health services. Pt endorses recent symptoms of lack of appetite, sleep disturbance, and isolating from others. When prompted to discuss suicidal ideation, Pt stated if he were to discharge, he may have to kill himself due to concern of his safety. Pt denies any plan, yet states he is running out of things to do in order to get away from the hackers. Pt reports while being on the Em[facility] unit, he has been able to eat and sleep, yet " "contributes it to medication administration, and believes the hackers will be waiting for him once he leaves the unit.    Changes observed since initial assessment: Decrease in somatic symptoms, as Pt reports \"less energy\" that is hurting him, yet reports an increase in suicidal ideation in the form of if he were to discharge, he would think about killing himself due to concern for his safety.       Risk of Harm    [place] Suicide Severity Rating Scale Full Clinical Version: 01/23/2023  Suicidal Ideation  1. Wish to be Dead (Lifetime): Yes  1. Wish to be Dead (Past 1 Month): No  2. Non-Specific Active Suicidal Thoughts (Lifetime): Yes  2. Non-Specific Active Suicidal Thoughts (Past 1 Month): No  3. Active Suicidal Ideation with any Methods (Not Plan) Without Intent to Act (Lifetime): Yes  3. Active Suicidal Ideation with any Methods (Not Plan) Without Intent to Act (Past 1 Month): No  4. Active Suicidal Ideation with Some Intent to Act, Without Specific Plan (Lifetime): Yes  4. Active Suicidal Ideation with Some Intent to Act, Without Specific Plan (Past 1 Month): No  5. Active Suicidal Ideation with Specific Plan and Intent (Lifetime): Yes  5. Active Suicidal Ideation with Specific Plan and Intent (Past 1 Month): No  Intensity of Ideation  Most Severe Ideation Rating (Lifetime): 5  Description of Most Severe Ideation (Lifetime): 5  Most Severe Ideation Rating (Past 1 Month): 1  Description of Most Severe Ideation (Past 1 Month): 1  Frequency (Lifetime): Many times each day  Frequency (Past 1 Month): Less than once a week  Duration (Lifetime): More than 8 hours/persistent or continuous  Duration (Past 1 Month): Fleeting, few seconds or minutes  Controllability (Lifetime): Unable to control thoughts  Controllability (Past 1 Month): Easily able to control thoughts  Deterrents (Lifetime): Deterrents definitely did not stop you  Deterrents (Past 1 Month): Deterrents definitely stopped you from attempting " suicide  Reasons for Ideation (Lifetime): Completely to end or stop the pain (You couldn't go on living with the pain or how you were feeling)  Reasons for Ideation (Past 1 Month): Completely to get attention, revenge, or a reaction from others  Suicidal Behavior  Actual Attempt (Lifetime): Yes  Total Number of Actual Attempts (Lifetime): 2  Actual Attempt Description (Lifetime): 2007 ingestion lead, 2017 ingestion Tylenol  Actual Attempt (Past 3 Months): No  Has subject engaged in non-suicidal self-injurious behavior? (Lifetime): No  Interrupted Attempts (Lifetime): No  Aborted or Self-Interrupted Attempt (Lifetime): No  Preparatory Acts or Behavior (Lifetime): No  C-SSRS Risk (Lifetime/Recent)  Calculated C-SSRS Risk Score (Lifetime/Recent): Moderate Risk    [place] Suicide Severity Rating Scale Since Last Contact: 01/24/2023  Suicidal Ideation (Since Last Contact)  1. Wish to be Dead (Since Last Contact): Yes  2. Non-Specific Active Suicidal Thoughts (Since Last Contact): Yes  3. Active Suicidal Ideation with any Methods (Not Plan) Without Intent to Act (Since Last Contact): No  4. Active Suicidal Ideation with Some Intent to Act, Without Specific Plan (Since Last Contact): No  5. Active Suicidal Ideation with Specific Plan and Intent (Since Last Contact): No  Suicidal Behavior (Since Last Contact)  Actual Attempt (Since Last Contact): No  Has subject engaged in non-suicidal self-injurious behavior? (Since Last Contact): No  Interrupted Attempts (Since Last Contact): No  Aborted or Self-Interrupted Attempt (Since Last Contact): No  Preparatory Acts or Behavior (Since Last Contact): No  Suicide (Since Last Contact): No  Actual/Potential Lethality (Most Lethal Attempt)  Most Lethal Attempt Date:  (2017 ingestion of Tylenol)  C-SSRS Risk (Since Last Contact)  Calculated C-SSRS Risk Score (Since Last Contact): Low Risk    Validity of evaluation is impacted by presenting factors during interview psychosis, paranoia.    Comments regarding subjective versus objective responses to [place] tool: na  Environmental or Psychosocial Events: loss of relationship due to divorce/separation, helplessness/hopelessness, impulsivity/recklessness and neither working nor attending school  Chronic Risk Factors: history of suicide attempts (x2), history of psychiatric hospitalization, chronic and ongoing sleep difficulties and serious, persistent mental illness   Warning Signs: hopelessness, feeling trapped, like there is no way out, anxiety, agitation, unable to sleep, sleeping all the time and recent discharges from emergency department or inpatient psychiatric care  Protective Factors: strong bond to family unit, community support, or employment, lives in a responsibly safe and stable environment, able to access care without barriers, supportive ongoing medical and mental health care relationships and help seeking  Interpretation of Risk Scoring, Risk Mitigation Interventions and Safety Plan:  It is the recommendation of this clinician that the patient be admitted to inpatient mental health care for further treatment, stabilization and safety. Attempts at managing mental health symptoms and maintaining safety at a lower level of care have been unsuccessful. Patient s current mental health symptoms are requiring a secure setting due to: pt is displaying symptoms of psychosis that are impairing ability to function safely in the community.       Does the patient have thoughts of harming others? No    Mental Status Exam   Affect: Blunted   Appearance: Appropriate    Attention Span/Concentration: Attentive?    Eye Contact: Variable   Fund of Knowledge: Appropriate    Language /Speech Content: Fluent   Language /Speech Volume: Normal    Language /Speech Rate/Productions: Pressured    Recent Memory: Intact   Remote Memory: Intact   Mood: Anxious and Irritable    Orientation to Person: Yes    Orientation to Place: Yes   Orientation to Time of Day: Yes  "   Orientation to Date: Yes    Situation (Do they understand why they are here?): Yes    Psychomotor Behavior: Normal    Thought Content: Delusions and Paranoia   Thought Form: Obsessive/Perseverative       Additional Collateral Information   The following information was received from Chaitanya Cuevas whose relationship to the patient is [facility]  was obtained via phone. Their phone number is # [phone] and they last had contact with patient on spoke with him briefly on Thursday, 1/19/2023.        How long have you been working with the patient: He shared he has been working with Pt for about 2 years.      How often do you see them: He shared \"once a month, decent attendance for that, misses some months.\"     What is the patient's legal status (Commitment, probation, guardian, etc.): He shared \"no commitment, his own guardian.\"      How does their current level of functioning compare to baseline: He shared \"not really funcitioning at all. Not willing to leave his group home except to go to his parents.\" He shared Pt has baseline mental health symptoms \"of delusions and paranoia, phones are tapped, spied upon, tracked, can't trust electronics of any kind, because they are being used to spy on him or take his information, very anxious, displaying physical symptoms, shaking, twitching, stuttering, pressured speech, sweating profusely.\"       Concern about alcohol/drug use? Other: He shared \"there was intermittent drug use about a year ago. I would say I'm not concerned, does not have the opportunity to get drugs anyway.\" He shared Pt has a history of using methamphetamine. Denied any concern regarding alcohol use/abuse.     Has the patient made any comments about wanting to kill themselves/others: Other: He shared Pt has not made \"direct comments recently, I noticed past several months, high increase in mental health symptoms, paranoia and delusions. I don't think he has " "attempted.\" He shared \"within the last year suicidal thoughts with a plan he couldn't act on, he was going to shoot himself yet no access to firearms.\" When prompted to discuss comments about wanting to harm others or a history of doing so, he shared \"no, can be very irritable and be verbally aggressive, yet no intention to harm anybody.\"      What is your treatment plan going forward: He shared continuing to meet once a month. He shared Pt recently restarted [facility] services with once a week.     Other information: He shared \"he was very recently admitted to the hoptal, at [facility], maybe been a week since he was there, they were recommmending a med change.\" He shared Pt was there with a similar presentation, from 12/29  - 1/11. He shared Pt was also there beginning of December for same reason. He shared there were also recent ED visits in November 2022, yet was not admitted during those visits. He shared Pt's symptoms were slowly amping up, and recently they have really gotten bad.      Therapeutic Intervention  The following therapeutic methodologies were employed when working with the patient: Establishing rapport, Active listening, Assess dimensions of crisis, Apply solution-focused therapy to address current crisis, Identify additional supports and alternative coping skills and Safety planning. Patient response to intervention: cooperative and engaged.    Diagnosis:   F25.1 Schizoaffective disorder, depressed type  F41.1 Generalized anxiety disorder  F15.90 Stimulant use disorder, severe, dependence - by history    Clinical Substantiation of Recommendations  It is the recommendation of this clinician that the patient be admitted to inpatient mental health care for further treatment, stabilization and safety. Attempts at managing mental health symptoms and maintaining safety at a lower level of care have been unsuccessful. Patient s current mental health symptoms are requiring a secure setting due to: pt " is displaying symptoms of psychosis that are impairing ability to function safely in the community.     Plan:    Disposition  Recommended disposition: Inpatient Mental Health    Admission to Inpatient Level of Care is indicated due to:    1. Patient risk of severity of behavioral health disorder is appropriate to proposed level of care as indicated by:    Imminent Risk of Harm: Command auditory hallucinations or paranoid delusions contributing to risk of suicide or self harm  And/or:  Behavioral health disorder is present and appropriate for inpatient care with both of the following:     Severe psychiatric, behavioral or other comorbid conditions are appropriate for management at inpatient mental health as indicated by at least one of the following:   o Hallucinations; delusions; positive symptoms    Severe dysfunction in daily living is present as indicated by at least one of the following:   o Extreme deterioration in social interactions, Complete neglect of self care with associated impairment in physical status and Complete inability to maintain any appropriate aspect of personal responsibility in any adult roles    2. Inpatient mental health services are necessary to meet patient needs and at least one of the following:  Specific condition related to admission diagnosis is present and judged likely to deteriorate in absence of treatment at proposed level of care    3. Situation and expectations are appropriate for inpatient care, as indicated by one of the following:   Patient management/treatment at lower level of care is not feasible or is inappropriate        Reviewed case and recommendations with attending provider. Attending Name: Dr. Heard      Attending concurs with disposition: Yes      Patient concurs with disposition: Yes      Final disposition: Inpatient mental health .         Assessment Details  Total duration spent on the patient case in minutes: .50 hrs     CPT code(s) utilized: 64896 -  Psychotherapy (with patient) - 30 (16-37*) min       Elmer Malloy, [facility], [facility]  Callback: [phone]

## 2023-01-24 NOTE — ED NOTES
Pt. reports that he is very happy about his ability to achieve sleep last night. VSS. Ordered and ate breakfast. Currently meeting with [facility]. Continues to verbalize desire to transition to Inpatient.

## 2023-01-24 NOTE — TELEPHONE ENCOUNTER
Bed search update @  3 :00 PM         [facility] 0 beds posted      [facility] updated at 2:29 PM      [facility]: @ cap per website per Tessie       [facility]: @ cap per website.       [facility]: @ cap per website      Patient remains on wait list until appropriate bed is available

## 2023-01-24 NOTE — ED NOTES
Pt. resting comfortably. Reports significant relief of anxiety. Seen by Psychiatry. Plan to transition to Inpatient .

## 2023-01-24 NOTE — PROGRESS NOTES
Pt was up briefly during the NOC for a snack, he voiced no complaints and  was calm and cooperative. He has appears to have slept well , his EKG was done.

## 2023-01-24 NOTE — ED PROVIDER NOTES
"[facility] - Initial Psychiatric Observation Note  [facility]  Observation Initiation Date: Jan 23, 2023    Tim Kelley MRN: [id number]   Age: 47 year old YOB: 1975     History     Chief Complaint   Patient presents with     Paranoid     Psychiatric Evaluation     HPI  Tim Kelley is a 47 year old male with a past history notable for schizoaffective disorder, bipolar type, paranoid delusions, hx of stimulant use disorder, HTN, migraines. Patient presented to the emergency department from his group home for evaluation of paranoid delusions and severe anxiety. Patient was medically evaluated in the emergency department and deemed medically clear for transfer to [facility] for further psychiatric assessment.     Per review of the chart, patient was psychiatrically hospitalized 3 times in December 2022. Last admitted to [facility] on 12/29/22, discharged on 1/11/23. He was prescribed propranolol 20 mg TID to help with anxiety and psosible akathisia. His ziprasidone dose was increased to 80 mg BID to help reduce paranoia. He had an outpatient visit on 1/20. At that visit, patient reported feeling \"cautiously optimistic.\" He apparently last used methamphetamine in September 2022 and Adderall December 2022 prior to his psychiatric hospitalizations. Patient has trialed a number of antipsychotic medication, including risperidone, aripiprazole, quetiapine, Vraylar, Saphris, ziprasidone. He was recommended to undergo ECT in December of 2022 but did not follow-through with this.     Here at [facility], patient presents with an intense affect. He is observed to be shaking his legs up and down. Appears to be experiencing some muscle twitching. Appears to have abnormal oral movements consistent with tardive dyskinesia. He reports that in 2015, a group of hackers got into his [employer] account. They were able to gain access into all of his social media accounts and locked " him out. He proceeded to send an email to himself, in hopes the hackers would see it, talking about having sex with the hacker's daughter and wife. Patient then went to the [employer] to tell them what had happened. They told him that he should not have sent the email and that his life could be in danger. He has worried about that since [id number] and feels there have been a number of things that have happened in the following years suggesting that the hackers are after him. He reports that currently, they are sending energy into his body, leading him to feel hot and have lots of energy. He reports that he is unable to sleep at night. Reports that he has not been eating well over the last 3 months and has lost 40-50 pounds. He also reports that he was placed on metformin after being on risperidone, which may have helped him lose some weight. He currently does not feel safe at his group home and would prefer to go inpatient. He denies suicidal or homicidal ideation. Denies auditory or visual hallucinations.         Past Medical History  Past Medical History:   Diagnosis Date     BIPOLAR DISORDER, DEPRESSIVE PHASE 8/6/2007     ED (erectile dysfunction)      Impulse control disorder, unspecified      Migraine, unspecified, without mention of intractable migraine without mention of status migrainosus      Other convulsions     EARLY CHILDHOOD     Past Surgical History:   Procedure Laterality Date     [facility] NONSPECIFIC PROCEDURE      HAND SUGERIES     albuterol (PROAIR HFA/PROVENTIL HFA/VENTOLIN HFA) 108 (90 Base) MCG/ACT inhaler  BREO ELLIPTA 100-25 MCG/ACT inhaler  diltiazem ER (DILT-XR) 120 MG 24 hr capsule  hydrOXYzine (ATARAX) 25 MG tablet  isosorbide mononitrate (IMDUR) 30 MG 24 hr tablet  lamoTRIgine (LAMICTAL) 150 MG tablet  lidocaine (LIDODERM) 5 % patch  loratadine 10 MG capsule  melatonin 3 MG tablet  metFORMIN (GLUCOPHAGE XR) 500 MG 24 hr tablet  mirtazapine (REMERON) 15 MG tablet  Multiple Vitamin (ONE-A-DAY  "ESSENTIAL) TABS  propranolol (INDERAL) 20 MG tablet  ziprasidone (GEODON) 80 MG capsule      Allergies   Allergen Reactions     No Known Drug Allergies      Family History  Family History   Problem Relation Age of Onset     Hypertension Paternal Grandmother      Hypertension Paternal Uncle      Hypertension Paternal Uncle      Depression Maternal Grandfather      Heart Disease Paternal Grandfather         several heart surgeries     C.A.D. Father         PTCA with 2 stents     Lipids Father      Social History   Social History     Tobacco Use     Smoking status: Never     Smokeless tobacco: Never   Substance Use Topics     Alcohol use: Yes     Comment: 2-3 per week     Drug use: No          Review of Systems  A medically appropriate review of systems was performed with pertinent positives and negatives noted in the HPI, and all other systems negative.    Physical Examination   BP: (!) 158/95  Pulse: 82  Temp: 98.5  F (36.9  C)  Resp: 18  Height: 188 cm (6' 2\")  Weight: 114.8 kg (253 lb)  SpO2: 97 %    Physical Exam  General: Appears stated age.   Neuro: Alert and fully oriented. Extremities appear to demonstrate normal strength on visual inspection.   Integumentary/Skin: no rash visualized, normal color    Psychiatric Examination   Appearance: awake, alert, adequately groomed, appeared as age stated and casually dressed  Attitude:  cooperative  Eye Contact:  good  Mood:  anxious  Affect:  mood congruent and intensity is heightened  Speech:  clear, coherent and pressured speech  Psychomotor Behavior:  evidence of tardive dyskinesia, fidgeting and physical agitation  Thought Process:  illogical and tangental  Associations:  no loose associations  Thought Content:  no evidence of suicidal ideation or homicidal ideation and experiencing paranoid delusions. Denies auditory or visual hallucinations  Insight:  fair  Judgement:  intact  Oriented to:  time, person, and place  Attention Span and Concentration:  fair  Recent " and Remote Memory:  intact  Language: able to name/identify objects without impairment  Fund of Knowledge: intact with awareness of current and past events    ED Course        Labs Ordered and Resulted from Time of ED Arrival to Time of ED Departure   COVID-19 VIRUS (CORONAVIRUS) BY PCR - Normal       Result Value    SARS CoV2 PCR Negative         Assessments & Plan (with Medical Decision Making)   Patient presenting with significant anxiety in the context of paranoid delusions. He is on quite a high dose of ziprasidone, which appears to be causing extra-pyramidal side effects including abnormal oral movements, muscle twitching, and akathisia. It appears he does have a history of stimulant abuse but the group home staff do not believe he is currently using. Will obtain urine drug screen to rule this out. Nursing notes reviewed noting no acute issues.     I have reviewed the assessment completed by the [facility].     During the observation period, the patient did not require medications for agitation, and did not require restraints/seclusion for patient and/or provider safety.     The patient was found to have a psychiatric condition that would benefit from an observation stay in the emergency department for further psychiatric stabilization and/or coordination of a safe disposition. The observation plan includes serial assessments of psychiatric condition, potential administration of medications if indicated, further disposition pending the patient's psychiatric course during the monitoring period.     Preliminary diagnosis:    ICD-10-CM    1. Paranoia (H)  F22       2. Schizoaffective disorder, bipolar type (H)  F25.0       3. Anxiety  F41.9            Treatment Plan:  - Will reduce ziprasidone dose as it appears patient is experiencing EPS, including possible akathisia as well as oral movements. Reduce from 80 mg BID to 80 mg in the morning and 40 mg in the evening. Consider further reductions.     - Will add lithium  300 mg BID for symptoms of song    - Continue lamotrigine 300 mg daily for mood stabilization    - Continue propranolol 20 mg TID for anxiety and akathisia    - Continue mirtazapine 15 mg at bedtime for sleep    - Haldol 5 mg q6h PRN for agitation, psychosis.     - Ativan 1 mg q4h PRN for anxiety/agitation    - Utox to assess for any recent stimulant use    - CMP, TSH, and ECG have been ordered    - Consider pharmacogenomic testing to rule out any drug metabolism abnormalities    - Patient will be registered to observation status overnight tonight. Reassess tomorrow. If not improving, may need admission to inpatient psychiatry for further medication adjustments and stabilization.    --  Memo Jarquin CNP   [facility] EMERGENCY DEPT  [facility] Unit  1/23/2023        Memo Jarquin CNP  01/23/23 [id number]

## 2023-01-24 NOTE — ED NOTES
"The following information was received from Chaitanya Cuevas whose relationship to the patient is [facility]  was obtained via phone. Their phone number is # [phone] and they last had contact with patient on spoke with him briefly on Thursday, 1/19/2023.      How long have you been working with the patient: He shared he has been working with Pt for about 2 years.     How often do you see them: He shared \"once a month, decent attendance for that, misses some months.\"    What is the patient's legal status (Commitment, probation, guardian, etc.): He shared \"no commitment, his own guardian.\"     How does their current level of functioning compare to baseline: He shared \"not really funcitioning at all. Not willing to leave his group home except to go to his parents.\" He shared Pt has baseline mental health symptoms \"of delusions and paranoia, phones are tapped, spied upon, tracked, can't trust electronics of any kind, because they are being used to spy on him or take his information, very anxious, displaying physical symptoms, shaking, twitching, stuttering, pressured speech, sweating profusely.\"      Concern about alcohol/drug use? Other: He shared \"there was intermittent drug use about a year ago. I would say I'm not concerned, does not have the opportunity to get drugs anyway.\" He shared Pt has a history of using methamphetamine. Denied any concern regarding alcohol use/abuse.    Has the patient made any comments about wanting to kill themselves/others: Other: He shared Pt has not made \"direct comments recently, I noticed past several months, high increase in mental health symptoms, paranoia and delusions. I don't think he has attempted.\" He shared \"within the last year suicidal thoughts with a plan he couldn't act on, he was going to shoot himself yet no access to firearms.\" When prompted to discuss comments about wanting to harm others or a history of doing so, he shared \"no, " "can be very irritable and be verbally aggressive, yet no intention to harm anybody.\"      What is your treatment plan going forward: He shared continuing to meet once a month. He shared Pt recently restarted [facility] services with once a week.    Other information: He shared \"he was very recently admitted to the hopsital, at [facility], maybe been a week since he was there, they were recommmending a med change.\" He shared Pt was there with a similar presentation, from 12/29  - 1/11. He shared Pt was also there beginning of December for same reason. He shared there were also recent ED visits in November 2022, yet was not admitted during those visits. He shared Pt's symptoms were slowly amping up, and recently they have really gotten bad.      "

## 2023-01-25 ENCOUNTER — TELEPHONE (OUTPATIENT)
Dept: BEHAVIORAL HEALTH | Facility: CLINIC | Age: 48
End: 2023-01-25

## 2023-01-25 ENCOUNTER — TELEPHONE (OUTPATIENT)
Dept: BEHAVIORAL HEALTH | Facility: CLINIC | Age: 48
End: 2023-01-25
Payer: MEDICARE

## 2023-01-25 PROCEDURE — 250N000013 HC RX MED GY IP 250 OP 250 PS 637: Performed by: NURSE PRACTITIONER

## 2023-01-25 PROCEDURE — G0378 HOSPITAL OBSERVATION PER HR: HCPCS

## 2023-01-25 PROCEDURE — 250N000013 HC RX MED GY IP 250 OP 250 PS 637: Performed by: PSYCHIATRY & NEUROLOGY

## 2023-01-25 RX ORDER — FINASTERIDE 5 MG/1
5 TABLET, FILM COATED ORAL DAILY
Status: DISCONTINUED | OUTPATIENT
Start: 2023-01-25 | End: 2023-01-26 | Stop reason: HOSPADM

## 2023-01-25 RX ORDER — FINASTERIDE 5 MG/1
1.25 TABLET, FILM COATED ORAL DAILY
COMMUNITY
Start: 2023-01-02

## 2023-01-25 RX ADMIN — ZIPRASIDONE HYDROCHLORIDE 40 MG: 40 CAPSULE ORAL at 18:22

## 2023-01-25 RX ADMIN — FLUTICASONE FUROATE AND VILANTEROL TRIFENATATE 2 PUFF: 100; 25 POWDER RESPIRATORY (INHALATION) at 20:26

## 2023-01-25 RX ADMIN — LORAZEPAM 1 MG: 1 TABLET ORAL at 20:25

## 2023-01-25 RX ADMIN — PROPRANOLOL HYDROCHLORIDE 20 MG: 10 TABLET ORAL at 20:25

## 2023-01-25 RX ADMIN — LITHIUM CARBONATE 300 MG: 300 CAPSULE, GELATIN COATED ORAL at 09:27

## 2023-01-25 RX ADMIN — MIRTAZAPINE 15 MG: 15 TABLET, FILM COATED ORAL at 20:25

## 2023-01-25 RX ADMIN — LITHIUM CARBONATE 300 MG: 300 CAPSULE, GELATIN COATED ORAL at 18:22

## 2023-01-25 RX ADMIN — LORATADINE 10 MG: 10 TABLET ORAL at 09:27

## 2023-01-25 RX ADMIN — PROPRANOLOL HYDROCHLORIDE 20 MG: 10 TABLET ORAL at 09:27

## 2023-01-25 RX ADMIN — DILTIAZEM HYDROCHLORIDE 120 MG: 120 CAPSULE, COATED, EXTENDED RELEASE ORAL at 09:26

## 2023-01-25 RX ADMIN — PROPRANOLOL HYDROCHLORIDE 20 MG: 10 TABLET ORAL at 13:32

## 2023-01-25 RX ADMIN — METFORMIN ER 500 MG 1500 MG: 500 TABLET ORAL at 09:26

## 2023-01-25 RX ADMIN — LAMOTRIGINE 300 MG: 100 TABLET ORAL at 09:27

## 2023-01-25 RX ADMIN — FINASTERIDE 5 MG: 5 TABLET, FILM COATED ORAL at 13:32

## 2023-01-25 RX ADMIN — HALOPERIDOL 5 MG: 5 TABLET ORAL at 20:25

## 2023-01-25 RX ADMIN — ZIPRASIDONE HCL 80 MG: 80 CAPSULE ORAL at 09:27

## 2023-01-25 RX ADMIN — ISOSORBIDE MONONITRATE 30 MG: 30 TABLET, EXTENDED RELEASE ORAL at 09:26

## 2023-01-25 ASSESSMENT — COLUMBIA-SUICIDE SEVERITY RATING SCALE - C-SSRS
TOTAL  NUMBER OF INTERRUPTED ATTEMPTS SINCE LAST CONTACT: NO
1. SINCE LAST CONTACT, HAVE YOU WISHED YOU WERE DEAD OR WISHED YOU COULD GO TO SLEEP AND NOT WAKE UP?: YES
2. HAVE YOU ACTUALLY HAD ANY THOUGHTS OF KILLING YOURSELF?: NO
REASONS FOR IDEATION SINCE LAST CONTACT: COMPLETELY TO END OR STOP THE PAIN (YOU COULDN'T GO ON LIVING WITH THE PAIN OR HOW YOU WERE FEELING)
6. HAVE YOU EVER DONE ANYTHING, STARTED TO DO ANYTHING, OR PREPARED TO DO ANYTHING TO END YOUR LIFE?: NO
SUICIDE, SINCE LAST CONTACT: NO
5. HAVE YOU STARTED TO WORK OUT OR WORKED OUT THE DETAILS OF HOW TO KILL YOURSELF? DO YOU INTEND TO CARRY OUT THIS PLAN?: NO
ATTEMPT SINCE LAST CONTACT: NO
TOTAL  NUMBER OF ABORTED OR SELF INTERRUPTED ATTEMPTS SINCE LAST CONTACT: NO

## 2023-01-25 ASSESSMENT — ACTIVITIES OF DAILY LIVING (ADL)
ADLS_ACUITY_SCORE: 35

## 2023-01-25 NOTE — PROGRESS NOTES
Pt spent most of the shift resting in his recliner. Pt was calm, pleasant and cooperative. He was med compliant. Pt denies any AV/H. Pt denies SI or HI. Pt continues to await inpatient placement. Pt is agreeable with the plan.

## 2023-01-25 NOTE — ED NOTES
"[facility] Crisis Reassessment      Tim Kelley was reassessed for the following reasons: Boarding waiting for  mental health services. Pt was first seen on 01/23/2023 by ODALIS Morse; see the initial assessment note for details.      Patient Presentation    Initial ED presentation details:  Writer met with patient in consult room B for mental health crisis assessment. Patient came to this interview willingly. He was alert & oriented. Patient appeared physically agitated and spoke with loud, hyperverbal speech. Patient got quite worked up as he spoke about being frightened for his safety due to hackers ruining his life, however he responded well to redirection, encouragement to lower his voice, and calm his body. Patient reported that in 2016 his [employer] ID and all online accounts were hacked. Patient sent an e-mail to himself around this time, which he says was intended for and read by the hackers, threatening to rape the hackers' daughters and wives and kill the hacker. Patient is remorseful over sending this email and requests help from hospital staff to \"make amends\" with the hackers, as ever since this time they have been trying to kill him. Patient states several times during this interview how he is sure writer does not believe his story, as no one ever believes him because it sounds so outrageous.      Patient states his current symptoms became much worse on 12/22/2022 but he does not report any specific trigger on this date. Patient explained his symptoms as energy on his body becoming out of control and painful and feeling very hot, with a prickly sensation like fingers are touching him. He also states his heart races, all saliva leaves his mouth, and his penis and testicles completely retract into his body. Patient is quite animated as he discusses his symptoms. Patient denies any thoughts of suicide or of harming himself, however he mentions to writer \"I don't know if I'm going to kill " "myself\" due to being afraid for his life. Patient denies thoughts of harming other people, auditory hallucinations, or visual hallucinations. Patient reports that his sleep is poor and he is mostly tossing and turning at night. His appetite is very poor and he is frequently sick to his stomach \"because I'm so scared and nervous.\" Patient stated to writer \"I need safety, I don't know where to go, I don't know if I'm going to kill myself, I need to talk to the FBI, I need to get across to these people how sorry I am.\" When asked how the hospital can help him, patient replied \"put me on meds that will help calm me down, keep me safe from killing myself, and connect me with someone from the FBI.\" Writer informed patient that our goal at the hospital is to treat his mental health and that staff do not get involved in patients' personal or legal affairs, to which patient verbalized understanding. Patient is requesting to be admitted to an inpatient mental health unit.     Current patient presentation:   Writer woke Pt up to engage in reassessment/therapeutic check-in. Pt was agreeable to the reassessment. Pt continues to endorse being concerned for his safety from the hackers and expressed wanting to continue to seek IP services. Writer informed Pt IP is still the plan. Pt reports he has been able to sleep on the unit, but only due to the medication given. Pt reports being able to eat food as well. Pt rated his anxiety at a 7/10 and his depression at a 8-9/10. Pt reports a reduction in the painful energy being sent by the hackers, yet acknowledges it is still there. Pt continues to endorse passive SI with hopes of being dead if he were to discharge due to running out of ideas of how to handle the hackers. Pt denies any HI or NSSIB since being on the unit.    Changes observed since initial assessment:   NA. Pt continues to endorse passive SI, safety concerns regarding hackers, and experiencing \"pain\" due to the energy sent " by the stephanie. Pt continues to seek inpatient mental health services.      Risk of Harm    [place] Suicide Severity Rating Scale Full Clinical Version: 01/23/2023  Suicidal Ideation  1. Wish to be Dead (Lifetime): Yes  1. Wish to be Dead (Past 1 Month): No  2. Non-Specific Active Suicidal Thoughts (Lifetime): Yes  2. Non-Specific Active Suicidal Thoughts (Past 1 Month): No  3. Active Suicidal Ideation with any Methods (Not Plan) Without Intent to Act (Lifetime): Yes  3. Active Suicidal Ideation with any Methods (Not Plan) Without Intent to Act (Past 1 Month): No  4. Active Suicidal Ideation with Some Intent to Act, Without Specific Plan (Lifetime): Yes  4. Active Suicidal Ideation with Some Intent to Act, Without Specific Plan (Past 1 Month): No  5. Active Suicidal Ideation with Specific Plan and Intent (Lifetime): Yes  5. Active Suicidal Ideation with Specific Plan and Intent (Past 1 Month): No  Intensity of Ideation  Most Severe Ideation Rating (Lifetime): 5  Description of Most Severe Ideation (Lifetime): 5  Most Severe Ideation Rating (Past 1 Month): 1  Description of Most Severe Ideation (Past 1 Month): 1  Frequency (Lifetime): Many times each day  Frequency (Past 1 Month): Less than once a week  Duration (Lifetime): More than 8 hours/persistent or continuous  Duration (Past 1 Month): Fleeting, few seconds or minutes  Controllability (Lifetime): Unable to control thoughts  Controllability (Past 1 Month): Easily able to control thoughts  Deterrents (Lifetime): Deterrents definitely did not stop you  Deterrents (Past 1 Month): Deterrents definitely stopped you from attempting suicide  Reasons for Ideation (Lifetime): Completely to end or stop the pain (You couldn't go on living with the pain or how you were feeling)  Reasons for Ideation (Past 1 Month): Completely to get attention, revenge, or a reaction from others  Suicidal Behavior  Actual Attempt (Lifetime): Yes  Total Number of Actual Attempts (Lifetime):  2  Actual Attempt Description (Lifetime): 2007 ingestion lead, 2017 ingestion Tylenol  Actual Attempt (Past 3 Months): No  Has subject engaged in non-suicidal self-injurious behavior? (Lifetime): No  Interrupted Attempts (Lifetime): No  Aborted or Self-Interrupted Attempt (Lifetime): No  Preparatory Acts or Behavior (Lifetime): No  C-SSRS Risk (Lifetime/Recent)  Calculated C-SSRS Risk Score (Lifetime/Recent): Moderate Risk    [place] Suicide Severity Rating Scale Since Last Contact: 01/25/2023  Suicidal Ideation (Since Last Contact)  1. Wish to be Dead (Since Last Contact): Yes  2. Non-Specific Active Suicidal Thoughts (Since Last Contact): No  3. Active Suicidal Ideation with any Methods (Not Plan) Without Intent to Act (Since Last Contact): No  4. Active Suicidal Ideation with Some Intent to Act, Without Specific Plan (Since Last Contact): No  5. Active Suicidal Ideation with Specific Plan and Intent (Since Last Contact): No  Suicidal Behavior (Since Last Contact)  Actual Attempt (Since Last Contact): No  Has subject engaged in non-suicidal self-injurious behavior? (Since Last Contact): No  Interrupted Attempts (Since Last Contact): No  Aborted or Self-Interrupted Attempt (Since Last Contact): No  Preparatory Acts or Behavior (Since Last Contact): No  Suicide (Since Last Contact): No  Actual/Potential Lethality (Most Lethal Attempt)  Most Lethal Attempt Date:  (2017 ingestion of medication)  C-SSRS Risk (Since Last Contact)  Calculated C-SSRS Risk Score (Since Last Contact): Low Risk    Validity of evaluation is impacted by presenting factors during interview psychosis, paranoia.   Comments regarding subjective versus objective responses to [place] tool: na  Environmental or Psychosocial Events: loss of relationship due to divorce/separation, helplessness/hopelessness, impulsivity/recklessness and neither working nor attending school  Chronic Risk Factors: history of suicide attempts (x2), history of psychiatric  hospitalization, chronic and ongoing sleep difficulties and serious, persistent mental illness   Warning Signs: hopelessness, feeling trapped, like there is no way out, anxiety, agitation, unable to sleep, sleeping all the time and recent discharges from emergency department or inpatient psychiatric care  Protective Factors: strong bond to family unit, community support, or employment, lives in a responsibly safe and stable environment, able to access care without barriers, supportive ongoing medical and mental health care relationships and help seeking  Interpretation of Risk Scoring, Risk Mitigation Interventions and Safety Plan:  It is the recommendation of this clinician that the patient be admitted to inpatient mental health care for further treatment, stabilization and safety. Attempts at managing mental health symptoms and maintaining safety at a lower level of care have been unsuccessful. Patient s current mental health symptoms are requiring a secure setting due to: pt is displaying symptoms of psychosis that are impairing ability to function safely in the community.       Does the patient have thoughts of harming others? No    Mental Status Exam   Affect: Blunted   Appearance: Appropriate    Attention Span/Concentration: Attentive?    Eye Contact: Engaged   Fund of Knowledge: Appropriate    Language /Speech Content: Fluent   Language /Speech Volume: Normal    Language /Speech Rate/Productions: Pressured    Recent Memory: Intact   Remote Memory: Intact   Mood: Anxious    Orientation to Person: Yes    Orientation to Place: Yes   Orientation to Time of Day: Yes    Orientation to Date: Yes    Situation (Do they understand why they are here?): Yes    Psychomotor Behavior: Normal    Thought Content: Delusions, Paranoia and Other: passive suicidal ideation   Thought Form: Obsessive/Perseverative       Additional Collateral Information   Writer contacted Chaitanya Cuevas (mental health , [phone]) to  provide update of Pt's status regarding pursuing IP services. Chaitanya stated he understood the information and appreciated the update.    Writer contacted Santhoshmaddie (, [phone]) to provide update of Pt's status regarding pursuing IP services. Santhoshmaddie stated she understood the information and appreciated the update and will relay the information to Pt's parents.       Therapeutic Intervention  The following therapeutic methodologies were employed when working with the patient: Active listening, Assess dimensions of crisis and Apply solution-focused therapy to address current crisis. Patient response to intervention: calm, cooperative, and engaged.    Diagnosis:   F25.1 Schizoaffective disorder, depressed type  F41.1 Generalized anxiety disorder  F15.90 Stimulant use disorder, severe, dependence - by history    Clinical Substantiation of Recommendations  It is the recommendation of this clinician that the patient be admitted to inpatient mental health care for further treatment, stabilization and safety. Attempts at managing mental health symptoms and maintaining safety at a lower level of care have been unsuccessful. Patient s current mental health symptoms are requiring a secure setting due to: pt is displaying symptoms of psychosis that are impairing ability to function safely in the community.     Plan:     Disposition  Recommended disposition: Inpatient Mental Health    Admission to Inpatient Level of Care is indicated due to:     1. Patient risk of severity of behavioral health disorder is appropriate to proposed level of care as indicated by:               Imminent Risk of Harm: Command auditory hallucinations or paranoid delusions contributing to risk of suicide or self harm  And/or:  Behavioral health disorder is present and appropriate for inpatient care with both of the following:     Severe psychiatric, behavioral or other comorbid conditions are appropriate for management at inpatient  mental health as indicated by at least one of the following:   ? Hallucinations; delusions; positive symptoms    Severe dysfunction in daily living is present as indicated by at least one of the following:   ? Extreme deterioration in social interactions, Complete neglect of self care with associated impairment in physical status and Complete inability to maintain any appropriate aspect of personal responsibility in any adult roles     2. Inpatient mental health services are necessary to meet patient needs and at least one of the following:  Specific condition related to admission diagnosis is present and judged likely to deteriorate in absence of treatment at proposed level of care     3. Situation and expectations are appropriate for inpatient care, as indicated by one of the following:   Patient management/treatment at lower level of care is not feasible or is inappropriate           Reviewed case and recommendations with attending provider. Attending Name: Dr. Heard       Attending concurs with disposition: Yes       Patient concurs with disposition: Yes       Final disposition: Inpatient mental health .         Assessment Details  Total duration spent on the patient case in minutes: .25 hrs     CPT code(s) utilized: 99203 - Psychotherapy (with patient) - 30 (16-37*) min       MAU Mendez, [facility]  Callback: [phone]

## 2023-01-25 NOTE — ED NOTES
Pt reports he is unable to sleep. Requested PRN haldol and lorazepam because these medications successfully helped him sleep last night.

## 2023-01-25 NOTE — TELEPHONE ENCOUNTER
No appropriate beds are currently available within the  system. Bed search update (metro) @ 1:30AM:      [facility]: @ cap per website  [facility]: @ cap per website  [facility]: @ cap per website  [facility]: @ cap per website  [facility]: @ cap per website  [facility]: @ cap per website  [facility]: @ cap per website    Pt remains on work list until appropriate placement is available

## 2023-01-25 NOTE — ED NOTES
"Patient continues to await inpatient placement voluntarily. Pt endorses anxiety intermittently throughout the evening. Pt denies SI currently, however, states he would become suicidal if he were to discharge home due to anxiety around \"hackers.\" Pt spent evening watching TV in recliner with peers. Pt ate 100% of dinner. Pt is sleeping in recliner, respirations even and unlabored. Nursing will continue to monitor.  "

## 2023-01-26 ENCOUNTER — HOSPITAL ENCOUNTER (INPATIENT)
Facility: CLINIC | Age: 48
LOS: 14 days | Discharge: NURSING, REHAB OR LONG-TERM CARE | DRG: 885 | End: 2023-02-09
Attending: PSYCHIATRY & NEUROLOGY | Admitting: PSYCHIATRY & NEUROLOGY
Payer: MEDICARE

## 2023-01-26 VITALS
HEART RATE: 71 BPM | HEIGHT: 74 IN | DIASTOLIC BLOOD PRESSURE: 85 MMHG | WEIGHT: 253 LBS | SYSTOLIC BLOOD PRESSURE: 137 MMHG | TEMPERATURE: 98.6 F | RESPIRATION RATE: 16 BRPM | BODY MASS INDEX: 32.47 KG/M2 | OXYGEN SATURATION: 97 %

## 2023-01-26 DIAGNOSIS — F25.1 SCHIZOAFFECTIVE DISORDER, DEPRESSIVE TYPE (H): ICD-10-CM

## 2023-01-26 DIAGNOSIS — J45.30 MILD PERSISTENT ASTHMA, UNSPECIFIED WHETHER COMPLICATED: ICD-10-CM

## 2023-01-26 DIAGNOSIS — Z13.6 CARDIOVASCULAR SCREENING; LDL GOAL LESS THAN 160: ICD-10-CM

## 2023-01-26 DIAGNOSIS — K59.00 CONSTIPATION, UNSPECIFIED CONSTIPATION TYPE: ICD-10-CM

## 2023-01-26 DIAGNOSIS — R68.2 DRY MOUTH: ICD-10-CM

## 2023-01-26 DIAGNOSIS — R63.5 WEIGHT GAIN: ICD-10-CM

## 2023-01-26 DIAGNOSIS — T78.40XS ALLERGIC REACTION, SEQUELA: ICD-10-CM

## 2023-01-26 DIAGNOSIS — F22 PARANOIA (H): Primary | ICD-10-CM

## 2023-01-26 PROCEDURE — 99223 1ST HOSP IP/OBS HIGH 75: CPT | Mod: AI | Performed by: PSYCHIATRY & NEUROLOGY

## 2023-01-26 PROCEDURE — 124N000002 HC R&B MH UMMC

## 2023-01-26 PROCEDURE — 250N000013 HC RX MED GY IP 250 OP 250 PS 637: Performed by: PSYCHIATRY & NEUROLOGY

## 2023-01-26 PROCEDURE — G0378 HOSPITAL OBSERVATION PER HR: HCPCS

## 2023-01-26 PROCEDURE — 250N000013 HC RX MED GY IP 250 OP 250 PS 637: Performed by: STUDENT IN AN ORGANIZED HEALTH CARE EDUCATION/TRAINING PROGRAM

## 2023-01-26 RX ORDER — POLYETHYLENE GLYCOL 3350 17 G/17G
17 POWDER, FOR SOLUTION ORAL DAILY PRN
Status: DISCONTINUED | OUTPATIENT
Start: 2023-01-26 | End: 2023-02-09 | Stop reason: HOSPADM

## 2023-01-26 RX ORDER — PROPRANOLOL HYDROCHLORIDE 10 MG/1
20 TABLET ORAL 3 TIMES DAILY
Status: DISCONTINUED | OUTPATIENT
Start: 2023-01-26 | End: 2023-02-08

## 2023-01-26 RX ORDER — FLUTICASONE FUROATE AND VILANTEROL 100; 25 UG/1; UG/1
1 POWDER RESPIRATORY (INHALATION) DAILY
Status: DISCONTINUED | OUTPATIENT
Start: 2023-01-26 | End: 2023-02-09 | Stop reason: HOSPADM

## 2023-01-26 RX ORDER — BENZTROPINE MESYLATE 1 MG/1
1 TABLET ORAL AT BEDTIME
Status: DISCONTINUED | OUTPATIENT
Start: 2023-01-26 | End: 2023-01-27

## 2023-01-26 RX ORDER — HYDROXYZINE HYDROCHLORIDE 25 MG/1
25 TABLET, FILM COATED ORAL EVERY 4 HOURS PRN
Status: DISCONTINUED | OUTPATIENT
Start: 2023-01-26 | End: 2023-02-09 | Stop reason: HOSPADM

## 2023-01-26 RX ORDER — MAGNESIUM HYDROXIDE/ALUMINUM HYDROXICE/SIMETHICONE 120; 1200; 1200 MG/30ML; MG/30ML; MG/30ML
30 SUSPENSION ORAL EVERY 4 HOURS PRN
Status: DISCONTINUED | OUTPATIENT
Start: 2023-01-26 | End: 2023-02-09 | Stop reason: HOSPADM

## 2023-01-26 RX ORDER — LITHIUM CARBONATE 300 MG/1
600 CAPSULE ORAL 2 TIMES DAILY WITH MEALS
Status: ON HOLD | COMMUNITY
End: 2023-02-08

## 2023-01-26 RX ORDER — ISOSORBIDE MONONITRATE 30 MG/1
30 TABLET, EXTENDED RELEASE ORAL DAILY
Status: DISCONTINUED | OUTPATIENT
Start: 2023-01-26 | End: 2023-02-09 | Stop reason: HOSPADM

## 2023-01-26 RX ORDER — LORATADINE 10 MG/1
10 TABLET ORAL DAILY
Status: DISCONTINUED | OUTPATIENT
Start: 2023-01-26 | End: 2023-02-09 | Stop reason: HOSPADM

## 2023-01-26 RX ORDER — MIRTAZAPINE 15 MG/1
15 TABLET, FILM COATED ORAL
Status: DISCONTINUED | OUTPATIENT
Start: 2023-01-26 | End: 2023-01-30

## 2023-01-26 RX ORDER — OLANZAPINE 10 MG/2ML
10 INJECTION, POWDER, FOR SOLUTION INTRAMUSCULAR 3 TIMES DAILY PRN
Status: DISCONTINUED | OUTPATIENT
Start: 2023-01-26 | End: 2023-02-09 | Stop reason: HOSPADM

## 2023-01-26 RX ORDER — LANOLIN ALCOHOL/MO/W.PET/CERES
3 CREAM (GRAM) TOPICAL
Status: DISCONTINUED | OUTPATIENT
Start: 2023-01-26 | End: 2023-02-09 | Stop reason: HOSPADM

## 2023-01-26 RX ORDER — METFORMIN HYDROCHLORIDE 750 MG/1
1500 TABLET, EXTENDED RELEASE ORAL
Status: DISCONTINUED | OUTPATIENT
Start: 2023-01-26 | End: 2023-02-09 | Stop reason: HOSPADM

## 2023-01-26 RX ORDER — LITHIUM CARBONATE 150 MG/1
300 CAPSULE ORAL 2 TIMES DAILY WITH MEALS
Status: DISCONTINUED | OUTPATIENT
Start: 2023-01-26 | End: 2023-02-01

## 2023-01-26 RX ORDER — ZIPRASIDONE HYDROCHLORIDE 80 MG/1
80 CAPSULE ORAL 2 TIMES DAILY
Status: DISCONTINUED | OUTPATIENT
Start: 2023-01-26 | End: 2023-01-26

## 2023-01-26 RX ORDER — DILTIAZEM HYDROCHLORIDE 120 MG/1
120 CAPSULE, COATED, EXTENDED RELEASE ORAL DAILY
Status: DISCONTINUED | OUTPATIENT
Start: 2023-01-26 | End: 2023-02-09 | Stop reason: HOSPADM

## 2023-01-26 RX ORDER — ACETAMINOPHEN 325 MG/1
650 TABLET ORAL EVERY 4 HOURS PRN
Status: DISCONTINUED | OUTPATIENT
Start: 2023-01-26 | End: 2023-02-09 | Stop reason: HOSPADM

## 2023-01-26 RX ORDER — HALOPERIDOL 5 MG/1
5 TABLET ORAL AT BEDTIME
Status: DISCONTINUED | OUTPATIENT
Start: 2023-01-26 | End: 2023-01-27

## 2023-01-26 RX ORDER — LAMOTRIGINE 150 MG/1
300 TABLET ORAL DAILY
Status: DISCONTINUED | OUTPATIENT
Start: 2023-01-26 | End: 2023-02-09 | Stop reason: HOSPADM

## 2023-01-26 RX ORDER — ZIPRASIDONE HYDROCHLORIDE 60 MG/1
60 CAPSULE ORAL 2 TIMES DAILY
Status: DISCONTINUED | OUTPATIENT
Start: 2023-01-26 | End: 2023-01-26

## 2023-01-26 RX ORDER — OLANZAPINE 10 MG/1
10 TABLET ORAL 3 TIMES DAILY PRN
Status: DISCONTINUED | OUTPATIENT
Start: 2023-01-26 | End: 2023-02-09 | Stop reason: HOSPADM

## 2023-01-26 RX ORDER — MIRTAZAPINE 15 MG/1
15 TABLET, FILM COATED ORAL AT BEDTIME
Status: DISCONTINUED | OUTPATIENT
Start: 2023-01-26 | End: 2023-02-09 | Stop reason: HOSPADM

## 2023-01-26 RX ADMIN — ZIPRASIDONE HCL 60 MG: 60 CAPSULE ORAL at 10:17

## 2023-01-26 RX ADMIN — HALOPERIDOL 5 MG: 5 TABLET ORAL at 21:21

## 2023-01-26 RX ADMIN — LITHIUM CARBONATE 300 MG: 150 CAPSULE, GELATIN COATED ORAL at 10:17

## 2023-01-26 RX ADMIN — PROPRANOLOL HYDROCHLORIDE 20 MG: 10 TABLET ORAL at 20:33

## 2023-01-26 RX ADMIN — MIRTAZAPINE 15 MG: 15 TABLET, FILM COATED ORAL at 21:21

## 2023-01-26 RX ADMIN — ISOSORBIDE MONONITRATE 30 MG: 30 TABLET, EXTENDED RELEASE ORAL at 10:17

## 2023-01-26 RX ADMIN — METFORMIN HYDROCHLORIDE 1500 MG: 750 TABLET, EXTENDED RELEASE ORAL at 10:16

## 2023-01-26 RX ADMIN — DILTIAZEM HYDROCHLORIDE 120 MG: 120 CAPSULE, COATED, EXTENDED RELEASE ORAL at 10:17

## 2023-01-26 RX ADMIN — LAMOTRIGINE 300 MG: 150 TABLET ORAL at 10:17

## 2023-01-26 RX ADMIN — BENZTROPINE MESYLATE 1 MG: 1 TABLET ORAL at 21:21

## 2023-01-26 RX ADMIN — LITHIUM CARBONATE 300 MG: 150 CAPSULE, GELATIN COATED ORAL at 17:33

## 2023-01-26 RX ADMIN — LORATADINE 10 MG: 10 TABLET ORAL at 10:17

## 2023-01-26 RX ADMIN — PROPRANOLOL HYDROCHLORIDE 20 MG: 10 TABLET ORAL at 10:17

## 2023-01-26 ASSESSMENT — ACTIVITIES OF DAILY LIVING (ADL)
CONCENTRATING,_REMEMBERING_OR_MAKING_DECISIONS_DIFFICULTY: NO
ADLS_ACUITY_SCORE: 35
DRESS: INDEPENDENT
WALKING_OR_CLIMBING_STAIRS_DIFFICULTY: NO
DIFFICULTY_EATING/SWALLOWING: NO
ADLS_ACUITY_SCORE: 30
VISION_MANAGEMENT: GLASSES
ADLS_ACUITY_SCORE: 30
CHANGE_IN_FUNCTIONAL_STATUS_SINCE_ONSET_OF_CURRENT_ILLNESS/INJURY: NO
LAUNDRY: WITH SUPERVISION
ADLS_ACUITY_SCORE: 30
ADLS_ACUITY_SCORE: 35
TOILETING_ISSUES: NO
WEAR_GLASSES_OR_BLIND: YES
FALL_HISTORY_WITHIN_LAST_SIX_MONTHS: NO
ADLS_ACUITY_SCORE: 30
DRESSING/BATHING_DIFFICULTY: NO
ADLS_ACUITY_SCORE: 30
DOING_ERRANDS_INDEPENDENTLY_DIFFICULTY: NO
ADLS_ACUITY_SCORE: 30
ORAL_HYGIENE: INDEPENDENT
ADLS_ACUITY_SCORE: 30
ADLS_ACUITY_SCORE: 30
HYGIENE/GROOMING: INDEPENDENT
HYGIENE/GROOMING: INDEPENDENT

## 2023-01-26 NOTE — PLAN OF CARE
"Pt was admitted to the unit during the night last night and so only was able to get 1.75 hours of sleep. Pt was able to sleep in until 9 am this morning. Upon waking, pt was able to eat breakfast. Pt ate 50% of lunch. Pt has been isolative to his room all day, resting/sleeping. Pt states he is very tired as he has been unable to sleep lately. Per pt, he was unable to eat and sleep at home due to \"an energy running through his body\". Pt denies that this \"energy\" feels like song and states it is different and he does not know what it is. Pt endorses heightened anxiety lately and states he does not know what is causing this or what will help it. Pt declines wanting any intervention for anxiety at this time. Pt denies SI/SIB/HI/AVH. Pt endorses feeling very depressed as well. When asked to rate his depression from 0 to 10 with his worst depression being 10, pt rated current depression at 8/10. Pt has been cooperative and calm with writer. Pt did speak to his parents on the phone this afternoon with a little bit of encouragement. Affect remains flat all day. Pt is medication compliant, but declined scheduled inhaler this morning stating \"I don't need it today\". Appetite and fluid intake adequate. VSS. Pt denies acute pain. No medication side effects reported or observed this shift.     Writer spoke with pt's parents on the phone (MATIAS signed in chart) and gave update and provided support. Parents gave collateral information including that pt has received most of his mental health care through [facility] and that pt's maternal grandfather commit suicide. Pt's parents also reiterated concern about akathisia with geodon and expressed concern about previous antipsychotic causing weight gain of 60 lbs.   "

## 2023-01-26 NOTE — TELEPHONE ENCOUNTER
R:  No available [facility] beds are available.    [facility] Access Inpatient Bed Call Log 1/25/2023 @815AM   Facilities that have not updated websites in the last 12 hours have been called.    Adults:     [facility] is posting 0 beds.  Negative covid.        [facility] is posting 0 beds. Negative covid.       [facility] has 0 beds. [facility] preferred. - 10:22am, 6:00pm per Mona, facility at capacity for the day.     [facility] is posting 0 beds.       [facility] are posting 0 beds.       [facility] is posting 0 beds. Negative covid.       [facility] has 0 beds.       [facility] is posting 0 beds. Negative covid. Low acuity.         Pt will remain on [facility] worklist     
R: 6PM- Bed Search Update: [facility] only    [facility] is at capacity.  [facility] is posting 0 beds.   [facility] is posting 0 beds.   [facility] is posting 0 beds.   [facility] is posting 0 beds.   [facility] [place] is posting 0 beds.    [facility] is posting 0 beds.       Pt remains on waitlist pending available bed.     9:45pm- Dr. Ford accepts for 12/[place].   
---

## 2023-01-26 NOTE — PLAN OF CARE
"Initial Psychosocial Assessment    I have reviewed the chart, met with the patient, and developed Care Plan.  Information for assessment was obtained from: chart review and interview with patient     Presenting Problem:  Pt is a 46 yo male who presented to the ED with paranoia, delusions and extreme anxiety in context of medication non-adherence and increased psychosocial stressors.  He was not eating, drinking or sleeping. He was initially admitted to the [facility] unit at [facility], then transferred to station 12 due to paranoid delusion and severe anxiety posing a safety risk. He stopped taking his risperdal sometime in December due to weight gain. Pt is currently taking geodon and lamotrigine.  He has had numerous hospitalizations.  This is his 3rd since 2022.  He has a hx of Schizoaffective Dx and anxiety.  He has been paranoid about \"hackers and the WIFI.\"     He resides in a group home and wants to return there.  He is Voluntary    History of Mental Health and Chemical Dependency:  Patient's most recent inpatient psychiatric hospitalization was 2022 - 2023 at [facility].      He was civilly committed as mentally ill in  but is generally compliant during his hospitalizations. The patient has mental health providers in the community and wants to return to his group home at discharge.     CHEMICAL DEPENDENCY: The patient has a long history of alcohol and amphetamine abuse, however, he denies/minimizes use. He BAL and UA was negative upon admission but he was positive for amphetamines when he was admitted in early December. The patient explained that result stating that he had used adderall that was previously prescribed.     Family Description (Constellation, Family Psychiatric History):  There is a history of depression on both sides of his family, as well as, bipolar disorder on the paternal side. His maternal grandfather  by suicide (hanging) prior to the patient's birth. There " is no known history of chemical dependency.     PER DEC:  SOCIAL BACKGROUND: The patient was born and raised in [place] and grew up in an intact family. His has one sister. Prior documentation indicates that the patient's father was verbally abusive but the patient denies childhood trauma. The patient was  for three years and resettled in [place] but lacked the support needed for his mental health. He and his wife  and his parents brought him back to [place]. The patient has lived with his parents at times but becomes aggressive when he uses alcohol/drugs and they do not feel comfortable with him in their home. He currently lives in a group home in [facility] and his mother feels he receives good care. The patient's parents are his primary support network.     Living Situation:  Pt resides in group home - manager is Dasia - Phone: [phone]    Educational Background:  The patient graduated from high school and obtained a BA in business and [employer] from [employer]. He has no history of  services.     Occupational History:  Not working currently.    Financial Status:  He receives social security disability and has medical coverage through Medicare and Medicaid. The patient has a CADI waiver and receives [facility] and [facility] services.     Legal Issues:  Patient is his voluntary and is his own decision maker.   Has a hx of 2 commitments in [place]  The patient was convicted of disorderly conduct in 1999 and two DWI's in 2002 and 2010. He also had a parking violation in 2013, followed by a conviction of driving after a suspended license.     Ethnic/Cultural Issues:  No    Spiritual Orientation:  no     Service History:  no    Social Functioning (organization, interests):  no    Current Treatment Providers are:  Outpatient Psychiatrist: Dr. Greg Mensah, [facility] ([phone])  Outpatient Therapist: Matthew Emery, [facility]  [facility], [phone]  Primary Care Provider: Dr. Lonny Wright, [facility] ([phone])  Mental Health : Chaitanya Cuevas at Health [facility] ([phone])  CADI : Nalini at [facility] ([phone])  [facility] : Dasia ([phone])  [facility] Nurse: Anthony ([phone])    Social Service Assessment/Plan:  Patient will have psychiatric assessment and medication management by the psychiatrist. Medications will be reviewed and adjusted per MD as indicated. The treatment team will continue to assess and stabilize the patient's mental health symptoms with the use of medications and therapeutic programming. Hospital staff will provide a safe environment and a therapeutic milieu. Staff will continue to assess patient as needed. Patient will participate in unit groups and activities. Patient will receive individual and group support on the unit.     CTC will do individual inpatient treatment planning and after care planning. CTC will discuss options for increasing community supports with the patient. CTC will coordinate with outpatient providers and will place referrals to ensure appropriate follow up care is in place.

## 2023-01-26 NOTE — PLAN OF CARE
01/26/23 1345   Patient Belongings   Did you bring any home meds/supplements to the hospital?  No   Patient Belongings locker;sent to security per site process;other (see comments)   Belongings Search Yes   Clothing Search Yes     A               In patient locker: black backpack, hat, writing utensils, glasses in case x2, calculator, glue, 2 bags of random things in backpack, 2 manila folders, black fleece coat, two wallets sent to security, 2 notebooks, plaid pajama bottoms, 3/4 sleeve shirt grey and navy, black socks    Sent to security: two wallets    Admission:  I am responsible for any personal items that are not sent to the safe or pharmacy.  [facility] is not responsible for loss, theft or damage of any property in my possession.    Signature:  _________________________________ Date: _______  Time: _____                                              Staff Signature:  ____________________________ Date: ________  Time: _____      2nd Staff person, if patient is unable/unwilling to sign:    Signature: ________________________________ Date: ________  Time: _____     Discharge:  [facility] has returned all of my personal belongings:    Signature: _________________________________ Date: ________  Time: _____                                          Staff Signature:  ____________________________ Date: ________  Time: _____

## 2023-01-26 NOTE — PLAN OF CARE
Patient is a 47-year-old male who was admitted from the [facility] unit to station 12N due to paranoid delusions, severe anxiety and was being treated for worsening psychosis. Patient has hx of schizoaffective disorder and bipolar disorder. Per chart review patient is from a group home and has been exhibiting increase paranoia, delusional thinking that hackers have been spying on him, not eating and sleeping for multiple days. Patient Geodon was recently lower by NP who was suspecting EPS and Lithium was added for mood instability. Patient was fearful of returning to group home due to endorsing passive suicidal thoughts and prefer to stay at the hospital for psychiatric treatment to lessen the symptoms. Previous inpatient mental health treatment was at [facility] in December. Covid negative, Utox negative, voluntary admit. Per RN reported that patient was medication compliant and had no episode of cheeking.     Patient appeared calm/cooperative but reported being tired from the transfer, no aggressive or assaultive behavior noted. Patient endorses anxiety 4/10 and depression 2/10, Patient was agreeable to clothing search and signs consent for treatment. Denies SI/SIB/HI and hallucination, contract for safety. Patient denies drinking alcoholic beverage and smoking cigarette in the past 12 months. Ambulate independently, able to perform ADLs independently.

## 2023-01-26 NOTE — ED NOTES
"Writer called to give report. Spoke with CN. Per CN, they are \"trying to work things out\" so they can take the patient. Unable to take patient at this time. Report not given. Writer informed them a follow-up call will be made at 0300 in regarding transfer status so patient and family can be updated. Patient is resting comfortably now. Will continue to monitor.   "

## 2023-01-26 NOTE — PLAN OF CARE
Problem: General Plan of Care (Inpatient Behavioral)  Goal: Team Discussion  Team Plan:  BEHAVIORAL TEAM DISCUSSION  Plan: conduct initial psychosocial assessment, meet with psychiatrist and treatment team   Participants: Dr. Gala Enamorado, Kirill Nuñez RN, Dayanna Power LMFT, [facility]   Summary/Recommendations: See Plan  Medical/Physical: Refer to Medical Consult  Progress: continue to assess, pt just admitted

## 2023-01-26 NOTE — PROGRESS NOTES
Psychiatry History and Physical    Tim Kelley MRN# [id number]   Age: 47 year old YOB: 1975     Date of Admission:  1/26/2023          Assessment:   This patient is a 47 year old  male with history of schizoaffective disorder, bipolar disorder and stimulant use disorder who presented to ED with paranoid ideation and delusions in context of medication non-adherence and increased psychosocial stressors. Patient was initially admitted to the [facility] unit, then transferred to station 12 due to paranoid delusion and severe anxiety posing a safety risk. Symptoms and presentation at this time is most consistent with Schizoaffective disorder. I have discussed the risks, benefits, and alternatives of psychiatric hospitalization and medication treatment. Patient is amenable to a trial of haloperidol. Patient will likely benefit from close psychiatric follow up upon discharge.      Inpatient psychiatric hospitalization is warranted at this time for safety, stabilization, and possible adjustment in medications.         Diagnoses:   F25.1 Schizoaffective disorder, depressed type  F41.1 Generalized anxiety disorder  F15.90 Stimulant use disorder, severe, dependence - by history         Plan:   Target psychiatric symptoms and interventions:  Start haloperidol 5 mg at bedtime, administer with benztropine 1 mg qhs  Continue hydroxyzine 25 mg q4h prn for acute anxiety  Continue lamotrigine 300 mg qday  Continue lithium 300 mg BID  Continue mirtazapine 15 mg at bedtime  Continue propranolol 20 mg tid    Risks, benefits, and alternatives discussed at length with patient.     Medical Problems and Treatments:  - No acute medical concerns    Behavioral/Psychological/Social:  - Encourage unit programming    Safety:  - Safety precautions include: None  - Continue precautions as noted above  - Status 15 minute checks    Legal Status: voluntary    Disposition Plan   Reason for ongoing admission: is  "unable to care for self due to severe psychosis or song  Discharge location: TBD  Discharge Medications: not ordered  Follow-up Appointments: not scheduled    Entered by: Saurabh Watters MD on 1/26/2023 at 8:27 AM            Chief Complaint:     \"There is painful energy eminating from all parts of my body\"         History of Present Illness:     Per ED Provider Note dated 1/23/23:  Tim Kelley is a 47 year old male with a history of paranoia with psychosis, schizoaffective disorder, and bipolar disorder who presents with paranoid and psychiatric evaluation. The patient reports that he upset a community of hackers that are now sending \"harmful energy\" to his body. He states that his phone was hacked in 2015 and he was unable to use any of his applications, and he sent himself an email that said \"You took my wedding pictures away from me. When I find you I'm going to fuck your daughter and wife in front of you, and then I'm going to slit your throat\" assuming that the hackers would read it. He went to the [employer] department, and the  said that his life may not be the same after sending that email. He states people began showing up and standing at the front of his house in 2016, and the hackers send harmful energy to him that causes heart racing, his penis turns white and goes inside of him, his testicles shrivel up, pain in his joints, and his entire body vibrates. The vibrations cause the house to creak and books to fall off the shelf. This used to happen once a month, but it has been happening every day recently. He feels safer when he is in the hospital, but states the hackers are still able to put harmful energy in him. He has been hospitalized multiple times, but no one believes that he is truly in trouble. He thinks he needs the help of the [employer] cyber crime unit. No current symptoms.     Per [facility] Assessment dated 1/23/23:   Patient came to this interview willingly. He was " "alert & oriented. Patient appeared physically agitated and spoke with loud, hyperverbal speech. Patient got quite worked up as he spoke about being frightened for his safety due to hackers ruining his life, however he responded well to redirection, encouragement to lower his voice, and calm his body. Patient reported that in 2016 his [employer] ID and all online accounts were hacked. Patient sent an e-mail to himself around this time, which he says was intended for and read by the hackers, threatening to rape the hackers' daughters and wives and kill the hacker. Patient is remorseful over sending this email and requests help from hospital staff to \"make amends\" with the hackers, as ever since this time they have been trying to kill him. Patient states several times during this interview how he is sure writer does not believe his story, as no one ever believes him because it sounds so outrageous.      Patient states his current symptoms became much worse on 12/22/2022 but he does not report any specific trigger on this date. Patient explained his symptoms as energy on his body becoming out of control and painful and feeling very hot, with a prickly sensation like fingers are touching him. He also states his heart races, all saliva leaves his mouth, and his penis and testicles completely retract into his body. Patient is quite animated as he discusses his symptoms. Patient denies any thoughts of suicide or of harming himself, however he mentions to writer \"I don't know if I'm going to kill myself\" due to being afraid for his life. Patient denies thoughts of harming other people, auditory hallucinations, or visual hallucinations. Patient reports that his sleep is poor and he is mostly tossing and turning at night. His appetite is very poor and he is frequently sick to his stomach \"because I'm so scared and nervous.\" Patient stated to writer \"I need safety, I don't know where to go, I don't know if I'm going to kill myself, " "I need to talk to the FBI, I need to get across to these people how sorry I am.\" When asked how the hospital can help him, patient replied \"put me on meds that will help calm me down, keep me safe from killing myself, and connect me with someone from the FBI.\" Writer informed patient that our goal at the hospital is to treat his mental health and that staff do not get involved in patients' personal or legal affairs, to which patient verbalized understanding. Patient is requesting to be admitted to an inpatient mental health unit.     Per [facility] Assessment dated 1/23/23:  Tim Kelley is a 47 year old male with a past history notable for schizoaffective disorder, bipolar type, paranoid delusions, hx of stimulant use disorder, HTN, migraines. Patient presented to the emergency department from his group home for evaluation of paranoid delusions and severe anxiety. Patient was medically evaluated in the emergency department and deemed medically clear for transfer to [facility] for further psychiatric assessment.      Per review of the chart, patient was psychiatrically hospitalized 3 times in December 2022. Last admitted to [facility] on 12/29/22, discharged on 1/11/23. He was prescribed propranolol 20 mg TID to help with anxiety and possible akathisia. His ziprasidone dose was increased to 80 mg BID to help reduce paranoia. He had an outpatient visit on 1/20. At that visit, patient reported feeling \"cautiously optimistic.\" He apparently last used methamphetamine in September 2022 and Adderall December 2022 prior to his psychiatric hospitalizations. Patient has trailed a number of antipsychotic medication, including risperidone, aripiprazole, quetiapine, Vraylar, Saphris, ziprasidone. He was recommended to undergo ECT in December of 2022 but did not follow-through with this.      Here at [facility], patient presents with an intense affect. He is observed to be shaking his legs up and down. Appears to be " "experiencing some muscle twitching. Appears to have abnormal oral movements consistent with tardive dyskinesia. He reports that in 2015, a group of hackers got into his [employer] account. They were able to gain access into all of his social media accounts and locked him out. He proceeded to send an email to himself, in hopes the hackers would see it, talking about having sex with the hacker's daughter and wife. Patient then went to the [employer] to tell them what had happened. They told him that he should not have sent the email and that his life could be in danger. He has worried about that since [id number] and feels there have been a number of things that have happened in the following years suggesting that the hackers are after him. He reports that currently, they are sending energy into his body, leading him to feel hot and have lots of energy. He reports that he is unable to sleep at night. Reports that he has not been eating well over the last 3 months and has lost 40-50 pounds. He also reports that he was placed on metformin after being on risperidone, which may have helped him lose some weight. He currently does not feel safe at his group home and would prefer to go inpatient. He denies suicidal or homicidal ideation. Denies auditory or visual hallucinations.     Ziprasidone reduced from 80 mg BID to 80 mg qam and 40 mg every evening then to 60 mg BID. Lithium 300 mg BID added for symptoms of song. Lamotrigine 300 mg qday, ,propranolol 20 mg TID, mirtazapine 15 mg at bedtime continued.      Per my interview with patient:  Patient was interviewed in his room while supine on bed. Anthony reports things were going well at his group home until 2 days before admission he began to experience \"energy coming out of my body that hurt\". He notes feeling similar pain previously which resolved during his last hospitalization but returned 4 days after discharge.  This pain gradually worsened while at the group home and he " "describes it as \"hot, tinging, and uncomfortable\" throughout all surfaces of his body. He is not sure what is causing the pain and reports good adherence to medications with observed and monitored administration at the group home. Anthony reports previously self discontinuing risperidone prior to his previous hospitalization for 40-50 lbs of weight gain.  He denies significant stressors or recent changes in his life or living situation. He denies illicit substance use or alcohol use. Anthony reports feeling unsafe due to \"the energy may kill me\" and reports his mood as \"scared and terrified\". He is open to medication trials but expresses \"you can't help me but I will try\". He reports sleeping \"a few hours\" each night, nightly nightmares and feel fatigued. Anthony endorses mild nausea and eating and drinking less than usual due to reduced appetite. His last BM was this morning, described as hard but declined offer for laxative. He denies other health concerns or suicidal ideation.               Psychiatric Review of Systems:   Depression:   Reports: depressed mood,  decreased interest, changes in sleep, changes in appetite, hopelessness, helplessness, impaired concentration, decreased energy, irritability.   Denies: guilt  Tere:   Reports: none  Denies: sleeplessness, increased goal-directed activities, abrupt increase in energy, pressured speech  Psychosis:   Reports:  paranoia  Denies: visual hallucinations, auditory hallucinations, grandiosity  Anxiety:   Reports: excessive worries that are difficult to control for the past 6 months  Denies:  panic attacks  PTSD:   Reports: intrusive thoughts, nightmares, increased arousal, avoidance of traumatic stimuli, impaired function.  Denies: re-experiencing past trauma  OCD:   Reports: none  Denies: obsessions, checking, symmetry, cleaning, skin picking.  ED:   Reports: none  Denies: restriction, binging, purging.           Medical Review of Systems:     Review of systems " "positive for none.   10 point review of systems is otherwise negative unless noted above.            Psychiatric History:   Psychiatric Hospitalizations: Last occurring from 12/29/22/-1/11/23 at [facility]. 6 total prior psychiatric hospitalizations.   History of Psychosis: Patient reports \"years ago\"  Prior ECT: Denies  Court Commitment: Civil commitment as mentally in 2000.   Suicide Attempts: Denies  Self-injurious Behavior: Denies  Violence toward others: Denies  Use of Psychotropics:   - Antipsychotics: Risperdal consta (helpful, weight gain), olanzapine (weight gain), lurasidone (120 mg highest dose), quetiapine, aripiprazole (30 mg max dose), Vraylar (akathisia? ~2 month trial, no benefit), Haldol (tried in hospital), Saphris, ziprasidone (12/2022)  - Antidepressants: paroxetine (40 mg), Wellbutrin XL (150 mg), mirtazapine (15 mg, weight gain), buspirone (can't recall details)  - Mood stabilizers: lamotrigine (cannot recall any benefit, unclear dose, took for years), lithium (GI side effects), Depakote  - anxiolytics/sleep agents: hydroxyzine, clonidine (0.1 mg BID, no benefit), gabapentin, trazodone, propranolol  - Other: Adderall (multiple instances of overuse, worsening of psychosis/song), Strattera (100 mg max dose), propranolol  ECT/TMS:  He was recommended to undergo ECT in December of 2022 but did not follow-through with this.          Substance Use History:   Alcohol: Denies use. History of alcohol dependence. Abstinent for 10-15 yrs  Cannabis: none   Nicotine: none  Cocaine: none  Methamphetamine: Last methamphetamine use in 9/2022 and Adderall in 12/2022. History of positive amphetamines test during 12/2022 admission.   Opiates/Heroin: none  Benzodiazepines: none  Hallucinogens: none  Inhalants: none    Prior Chemical Dependency treatment: Residential CD treatment twice for methamphetamine for 2017, 2022    For CD only:   Denies hx of overdose, IV use, hepatitis, HIV, abscesses, lab " "abnormalities    Patient does not report tolerance, withdrawal, progressive use, loss of control, spending more time and more amount than intended. Patient has made attempts to quit, is experiencing cravings, and reports negative consequences.  Patient does not phillips.     Patient does not have a history of seizures or delirium tremens.          Social History:   Upbringing: The patient was born and raised in [place], [place] and grew up in an intact family. Describes growing up as \"good\", did well in school and denies disciplinary issues.   Educational History: The patient graduated from high school and obtained a BA in business and [employer] from [employer].  Relationships: One older sister. , the patient was  for three years and resettled in [place] but lacked the support needed for his mental health. The patient's parents are his primary support network.   Children: None  Current Living Situation: The patient has lived with his parents at times but becomes aggressive when he uses alcohol/drugs and they do not feel comfortable with him in their home. He currently lives in a group home in [facility].  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   Financial Support: He receives social security disability and has medical coverage through Medicare and Medicaid. The patient has a CADI waiver and receives [facility] and [facility] services.  Legal History: The patient was convicted of disorderly conduct in 1999 and two DWI's in 2002 and 2010. He also had a parking violation in 2013, followed by a conviction of driving after a suspended license.   Abuse/Trauma History: Prior documentation indicates that the patient's father was verbally abusive but the patient denies childhood trauma         Family History:   Mother: Depression  Father: Depression   Paternal uncle: Bipolar disorder    Denies knowledge " of history of CD, schizophrenia or bipolar in other family members.     H/o completed suicides in family: maternal grandfather via hanging.          Past Medical History:     Past Medical History:   Diagnosis Date     BIPOLAR DISORDER, DEPRESSIVE PHASE 8/6/2007     ED (erectile dysfunction)      Impulse control disorder, unspecified      Migraine, unspecified, without mention of intractable migraine without mention of status migrainosus      Other convulsions     EARLY CHILDHOOD              Past Surgical History:     Past Surgical History:   Procedure Laterality Date     [facility] NONSPECIFIC PROCEDURE      HAND SUGERIES              Allergies:      Allergies   Allergen Reactions     No Known Drug Allergies               Medications:   I have reviewed this patient's current medications  Medications Prior to Admission   Medication Sig Dispense Refill Last Dose     albuterol (PROAIR HFA/PROVENTIL HFA/VENTOLIN HFA) 108 (90 Base) MCG/ACT inhaler Inhale 1-2 puffs into the lungs every 4 hours as needed        BREO ELLIPTA 100-25 MCG/ACT inhaler Inhale 2 puffs into the lungs 2 times daily        diltiazem ER (DILT-XR) 120 MG 24 hr capsule Take 120 capsules by mouth daily        finasteride (PROSCAR) 5 MG tablet Take 5 mg by mouth daily        hydrOXYzine (ATARAX) 25 MG tablet Take 25 mg by mouth 3 times daily as needed        isosorbide mononitrate (IMDUR) 30 MG 24 hr tablet Take 1 tablet by mouth daily        lamoTRIgine (LAMICTAL) 150 MG tablet Take 150 mg by mouth every morning        lidocaine (LIDODERM) 5 % patch Place 1 patch onto the skin daily        loratadine 10 MG capsule Take 10 mg by mouth daily        melatonin 3 MG tablet Take 3 mg by mouth nightly as needed for sleep        metFORMIN (GLUCOPHAGE XR) 500 MG 24 hr tablet Take 1,500 mg by mouth every morning        mirtazapine (REMERON) 15 MG tablet Take 15 mg by mouth nightly as needed        Multiple Vitamin (ONE-A-DAY ESSENTIAL) TABS Take 1 tablet by mouth daily  "       propranolol (INDERAL) 20 MG tablet Take 20 mg by mouth 3 times daily        ziprasidone (GEODON) 80 MG capsule Take 80 mg by mouth 2 times daily                Labs:   No results found for this or any previous visit (from the past 24 hour(s)).    /72 (BP Location: Right arm, Patient Position: Sitting)   Pulse 76   Temp 98.5  F (36.9  C) (Oral)   Resp 18   Ht 1.88 m (6' 2\")   Wt 113.4 kg (250 lb)   SpO2 95%   BMI 32.10 kg/m    Weight is 250 lbs 0 oz  Body mass index is 32.1 kg/m .         Psychiatric Mental Status Examination:   Appearance: awake, cooperative, lying in bed  Attitude: cooperative and calm  Eye Contact: good  Mood:  \"helpless\"  Affect: mood congruent and restricted  Speech:  clear, coherent and flattened prosody  Language: fluent in English  Psychomotor Behavior:  no evidence of tardive dyskinesia, dystonia, or tics  Gait/Station: Not assessed.   Thought Process:  linear, , goal oriented  Associations:  no loose associations  Thought Content:  Denying SI/HI/AVH; no evidence of psychotic thinking  Insight:  good  Judgement: intact  Oriented to:  time, person, and place  Attention Span and Concentration:  intact  Recent and Remote Memory:  intact  Fund of Knowledge: appropriate    Clinical Global Impressions  First: 7     Most recent: 7            Physical Exam:   Please refer to physical exam completed by ED provider, Hollis Roberts, on 1/23/23. I agree with the findings and assessment and have no additional findings to add at this time.     "

## 2023-01-26 NOTE — H&P
Psychiatry History and Physical    Tim Kelley MRN# [id number]   Age: 47 year old YOB: 1975     Date of Admission:  1/26/2023          Assessment:   This patient is a 47 year old  male with history of schizoaffective disorder, bipolar disorder and stimulant use disorder who presented to ED with paranoid ideation and delusions in context of medication non-adherence and increased psychosocial stressors. Patient was initially admitted to the [facility] unit, then transferred to station 12 due to paranoid delusion and severe anxiety posing a safety risk. Symptoms and presentation at this time is most consistent with Schizoaffective disorder. I have discussed the risks, benefits, and alternatives of psychiatric hospitalization and medication treatment. Patient is amenable to a trial of haloperidol. Patient will likely benefit from close psychiatric follow up upon discharge.      Inpatient psychiatric hospitalization is warranted at this time for safety, stabilization, and possible adjustment in medications.         Diagnoses:   F25.1 Schizoaffective disorder, depressed type  F41.1 Generalized anxiety disorder  F15.90 Stimulant use disorder, severe, dependence - by history         Plan:   Target psychiatric symptoms and interventions:  Start haloperidol 5 mg at bedtime, administer with benztropine 1 mg qhs  Continue hydroxyzine 25 mg q4h prn for acute anxiety  Continue lamotrigine 300 mg qday  Continue lithium 300 mg BID  Continue mirtazapine 15 mg at bedtime  Continue propranolol 20 mg tid    Risks, benefits, and alternatives discussed at length with patient.     Medical Problems and Treatments:  - No acute medical concerns    Behavioral/Psychological/Social:  - Encourage unit programming    Safety:  - Safety precautions include: None  - Continue precautions as noted above  - Status 15 minute checks    Legal Status: voluntary    Disposition Plan   Reason for ongoing admission: is  "unable to care for self due to severe psychosis or song  Discharge location: TBD  Discharge Medications: not ordered  Follow-up Appointments: not scheduled    Entered by: Gala Enamorado MD on 1/26/2023 at 5:57 PM            Chief Complaint:     \"There is painful energy eminating from all parts of my body\"         History of Present Illness:     Per ED Provider Note dated 1/23/23:  Tim Kelley is a 47 year old male with a history of paranoia with psychosis, schizoaffective disorder, and bipolar disorder who presents with paranoid and psychiatric evaluation. The patient reports that he upset a community of hackers that are now sending \"harmful energy\" to his body. He states that his phone was hacked in 2015 and he was unable to use any of his applications, and he sent himself an email that said \"You took my wedding pictures away from me. When I find you I'm going to fuck your daughter and wife in front of you, and then I'm going to slit your throat\" assuming that the hackers would read it. He went to the [employer] department, and the  said that his life may not be the same after sending that email. He states people began showing up and standing at the front of his house in 2016, and the hackers send harmful energy to him that causes heart racing, his penis turns white and goes inside of him, his testicles shrivel up, pain in his joints, and his entire body vibrates. The vibrations cause the house to creak and books to fall off the shelf. This used to happen once a month, but it has been happening every day recently. He feels safer when he is in the hospital, but states the hackers are still able to put harmful energy in him. He has been hospitalized multiple times, but no one believes that he is truly in trouble. He thinks he needs the help of the [employer] cyber crime unit. No current symptoms.     Per [facility] Assessment dated 1/23/23:   Patient came to this interview willingly. He was alert & " "oriented. Patient appeared physically agitated and spoke with loud, hyperverbal speech. Patient got quite worked up as he spoke about being frightened for his safety due to hackers ruining his life, however he responded well to redirection, encouragement to lower his voice, and calm his body. Patient reported that in 2016 his [employer] ID and all online accounts were hacked. Patient sent an e-mail to himself around this time, which he says was intended for and read by the hackers, threatening to rape the hackers' daughters and wives and kill the hacker. Patient is remorseful over sending this email and requests help from hospital staff to \"make amends\" with the hackers, as ever since this time they have been trying to kill him. Patient states several times during this interview how he is sure writer does not believe his story, as no one ever believes him because it sounds so outrageous.      Patient states his current symptoms became much worse on 12/22/2022 but he does not report any specific trigger on this date. Patient explained his symptoms as energy on his body becoming out of control and painful and feeling very hot, with a prickly sensation like fingers are touching him. He also states his heart races, all saliva leaves his mouth, and his penis and testicles completely retract into his body. Patient is quite animated as he discusses his symptoms. Patient denies any thoughts of suicide or of harming himself, however he mentions to writer \"I don't know if I'm going to kill myself\" due to being afraid for his life. Patient denies thoughts of harming other people, auditory hallucinations, or visual hallucinations. Patient reports that his sleep is poor and he is mostly tossing and turning at night. His appetite is very poor and he is frequently sick to his stomach \"because I'm so scared and nervous.\" Patient stated to writer \"I need safety, I don't know where to go, I don't know if I'm going to kill myself, I need " "to talk to the FBI, I need to get across to these people how sorry I am.\" When asked how the hospital can help him, patient replied \"put me on meds that will help calm me down, keep me safe from killing myself, and connect me with someone from the FBI.\" Writer informed patient that our goal at the hospital is to treat his mental health and that staff do not get involved in patients' personal or legal affairs, to which patient verbalized understanding. Patient is requesting to be admitted to an inpatient mental health unit.     Per [facility] Assessment dated 1/23/23:  Tim Kelley is a 47 year old male with a past history notable for schizoaffective disorder, bipolar type, paranoid delusions, hx of stimulant use disorder, HTN, migraines. Patient presented to the emergency department from his group home for evaluation of paranoid delusions and severe anxiety. Patient was medically evaluated in the emergency department and deemed medically clear for transfer to [facility] for further psychiatric assessment.      Per review of the chart, patient was psychiatrically hospitalized 3 times in December 2022. Last admitted to [facility] on 12/29/22, discharged on 1/11/23. He was prescribed propranolol 20 mg TID to help with anxiety and possible akathisia. His ziprasidone dose was increased to 80 mg BID to help reduce paranoia. He had an outpatient visit on 1/20. At that visit, patient reported feeling \"cautiously optimistic.\" He apparently last used methamphetamine in September 2022 and Adderall December 2022 prior to his psychiatric hospitalizations. Patient has trailed a number of antipsychotic medication, including risperidone, aripiprazole, quetiapine, Vraylar, Saphris, ziprasidone. He was recommended to undergo ECT in December of 2022 but did not follow-through with this.      Here at [facility], patient presents with an intense affect. He is observed to be shaking his legs up and down. Appears to be experiencing " "some muscle twitching. Appears to have abnormal oral movements consistent with tardive dyskinesia. He reports that in 2015, a group of hackers got into his [employer] account. They were able to gain access into all of his social media accounts and locked him out. He proceeded to send an email to himself, in hopes the hackers would see it, talking about having sex with the hacker's daughter and wife. Patient then went to the [employer] to tell them what had happened. They told him that he should not have sent the email and that his life could be in danger. He has worried about that since [id number] and feels there have been a number of things that have happened in the following years suggesting that the hackers are after him. He reports that currently, they are sending energy into his body, leading him to feel hot and have lots of energy. He reports that he is unable to sleep at night. Reports that he has not been eating well over the last 3 months and has lost 40-50 pounds. He also reports that he was placed on metformin after being on risperidone, which may have helped him lose some weight. He currently does not feel safe at his group home and would prefer to go inpatient. He denies suicidal or homicidal ideation. Denies auditory or visual hallucinations.     Ziprasidone reduced from 80 mg BID to 80 mg qam and 40 mg every evening then to 60 mg BID. Lithium 300 mg BID added for symptoms of song. Lamotrigine 300 mg qday, ,propranolol 20 mg TID, mirtazapine 15 mg at bedtime continued.      Per my interview with patient:  Patient was interviewed in his room while supine on bed. Anthony reports things were going well at his group home until 2 days before admission he began to experience \"energy coming out of my body that hurt\". He notes feeling similar pain previously which resolved during his last hospitalization but returned 4 days after discharge.  This pain gradually worsened while at the group home and he describes it " "as \"hot, tinging, and uncomfortable\" throughout all surfaces of his body. He is not sure what is causing the pain and reports good adherence to medications with observed and monitored administration at the group home. Anthony reports previously self discontinuing risperidone prior to his previous hospitalization for 40-50 lbs of weight gain.  He denies significant stressors or recent changes in his life or living situation. He denies illicit substance use or alcohol use. Anthony reports feeling unsafe due to \"the energy may kill me\" and reports his mood as \"scared and terrified\". He is open to medication trials but expresses \"you can't help me but I will try\". He reports sleeping \"a few hours\" each night, nightly nightmares and feel fatigued. Anthony endorses mild nausea and eating and drinking less than usual due to reduced appetite. His last BM was this morning, described as hard but declined offer for laxative. He denies other health concerns or suicidal ideation.               Psychiatric Review of Systems:   Depression:   Reports: depressed mood,  decreased interest, changes in sleep, changes in appetite, hopelessness, helplessness, impaired concentration, decreased energy, irritability.   Denies: guilt  Tere:   Reports: none  Denies: sleeplessness, increased goal-directed activities, abrupt increase in energy, pressured speech  Psychosis:   Reports:  paranoia  Denies: visual hallucinations, auditory hallucinations, grandiosity  Anxiety:   Reports: excessive worries that are difficult to control for the past 6 months  Denies:  panic attacks  PTSD:   Reports: intrusive thoughts, nightmares, increased arousal, avoidance of traumatic stimuli, impaired function.  Denies: re-experiencing past trauma  OCD:   Reports: none  Denies: obsessions, checking, symmetry, cleaning, skin picking.  ED:   Reports: none  Denies: restriction, binging, purging.           Medical Review of Systems:     Review of systems positive for none. " "  10 point review of systems is otherwise negative unless noted above.            Psychiatric History:   Psychiatric Hospitalizations: Last occurring from 12/29/22/-1/11/23 at [facility]. 6 total prior psychiatric hospitalizations.   History of Psychosis: Patient reports \"years ago\"  Prior ECT: Denies  Court Commitment: Civil commitment as mentally in 2000.   Suicide Attempts: Denies  Self-injurious Behavior: Denies  Violence toward others: Denies  Use of Psychotropics:   - Antipsychotics: Risperdal consta (helpful, weight gain), olanzapine (weight gain), lurasidone (120 mg highest dose), quetiapine, aripiprazole (30 mg max dose), Vraylar (akathisia? ~2 month trial, no benefit), Haldol (tried in hospital), Saphris, ziprasidone (12/2022)  - Antidepressants: paroxetine (40 mg), Wellbutrin XL (150 mg), mirtazapine (15 mg, weight gain), buspirone (can't recall details)  - Mood stabilizers: lamotrigine (cannot recall any benefit, unclear dose, took for years), lithium (GI side effects), Depakote  - anxiolytics/sleep agents: hydroxyzine, clonidine (0.1 mg BID, no benefit), gabapentin, trazodone, propranolol  - Other: Adderall (multiple instances of overuse, worsening of psychosis/song), Strattera (100 mg max dose), propranolol  ECT/TMS:  He was recommended to undergo ECT in December of 2022 but did not follow-through with this.          Substance Use History:   Alcohol: Denies use. History of alcohol dependence. Abstinent for 10-15 yrs  Cannabis: none   Nicotine: none  Cocaine: none  Methamphetamine: Last methamphetamine use in 9/2022 and Adderall in 12/2022. History of positive amphetamines test during 12/2022 admission.   Opiates/Heroin: none  Benzodiazepines: none  Hallucinogens: none  Inhalants: none    Prior Chemical Dependency treatment: Residential CD treatment twice for methamphetamine for 2017, 2022    For CD only:   Denies hx of overdose, IV use, hepatitis, HIV, abscesses, lab abnormalities    Patient does " "not report tolerance, withdrawal, progressive use, loss of control, spending more time and more amount than intended. Patient has made attempts to quit, is experiencing cravings, and reports negative consequences.  Patient does not phillips.     Patient does not have a history of seizures or delirium tremens.          Social History:   Upbringing: The patient was born and raised in [place], [place] and grew up in an intact family. Describes growing up as \"good\", did well in school and denies disciplinary issues.   Educational History: The patient graduated from high school and obtained a BA in business and [employer] from [place].  Relationships: One older sister. , the patient was  for three years and resettled in [place] but lacked the support needed for his mental health. The patient's parents are his primary support network.   Children: None  Current Living Situation: The patient has lived with his parents at times but becomes aggressive when he uses alcohol/drugs and they do not feel comfortable with him in their home. He currently lives in a group home in [place], [place] called [facility].  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   Financial Support: He receives social security disability and has medical coverage through Medicare and Medicaid. The patient has a CADI waiver and receives [facility] services.  Legal History: The patient was convicted of disorderly conduct in 1999 and two DWI's in 2002 and 2010. He also had a parking violation in 2013, followed by a conviction of driving after a suspended license.   Abuse/Trauma History: Prior documentation indicates that the patient's father was verbally abusive but the patient denies childhood trauma         Family History:   Mother: Depression  Father: Depression   Paternal uncle: Bipolar disorder    Denies knowledge of history of CD, " schizophrenia or bipolar in other family members.     H/o completed suicides in family: maternal grandfather via hanging.          Past Medical History:     Past Medical History:   Diagnosis Date     BIPOLAR DISORDER, DEPRESSIVE PHASE 8/6/2007     ED (erectile dysfunction)      Impulse control disorder, unspecified      Migraine, unspecified, without mention of intractable migraine without mention of status migrainosus      Other convulsions     EARLY CHILDHOOD              Past Surgical History:     Past Surgical History:   Procedure Laterality Date     [facility] NONSPECIFIC PROCEDURE      HAND SUGERIES              Allergies:      Allergies   Allergen Reactions     No Known Drug Allergies               Medications:   I have reviewed this patient's current medications  Medications Prior to Admission   Medication Sig Dispense Refill Last Dose     albuterol (PROAIR HFA/PROVENTIL HFA/VENTOLIN HFA) 108 (90 Base) MCG/ACT inhaler Inhale 1-2 puffs into the lungs every 4 hours as needed        BREO ELLIPTA 100-25 MCG/ACT inhaler Inhale 1 puff into the lungs daily   1/25/2023     diltiazem ER (DILT-XR) 120 MG 24 hr capsule Take 120 capsules by mouth daily   1/25/2023     finasteride (PROSCAR) 5 MG tablet Take 1.25 mg by mouth daily   1/25/2023     hydrOXYzine (ATARAX) 25 MG tablet Take 25 mg by mouth 3 times daily as needed   1/25/2023     isosorbide mononitrate (IMDUR) 30 MG 24 hr tablet Take 1 tablet by mouth daily   1/25/2023     lamoTRIgine (LAMICTAL) 150 MG tablet Take 300 mg by mouth every morning   1/25/2023     lidocaine (LIDODERM) 5 % patch Place 1 patch onto the skin daily   More than a month     lithium 300 MG capsule Take 600 mg by mouth 2 times daily (with meals)   1/25/2023     loratadine 10 MG capsule Take 10 mg by mouth daily   1/25/2023     melatonin 3 MG tablet Take 3 mg by mouth nightly as needed for sleep   Past Month     metFORMIN (GLUCOPHAGE XR) 500 MG 24 hr tablet Take 1,500 mg by mouth every morning    "1/25/2023     mirtazapine (REMERON) 15 MG tablet Take 15 mg by mouth nightly as needed   1/25/2023     Multiple Vitamin (ONE-A-DAY ESSENTIAL) TABS Take 1 tablet by mouth daily   More than a month     propranolol (INDERAL) 20 MG tablet Take 20 mg by mouth 3 times daily   1/25/2023     ziprasidone (GEODON) 80 MG capsule Take 80 mg by mouth 2 times daily   1/25/2023             Labs:   No results found for this or any previous visit (from the past 24 hour(s)).    /77 (BP Location: Left arm, Patient Position: Sitting, Cuff Size: Adult Regular)   Pulse 88   Temp 98.2  F (36.8  C) (Oral)   Resp 18   Ht 1.88 m (6' 2\")   Wt 113.4 kg (250 lb)   SpO2 95%   BMI 32.10 kg/m    Weight is 250 lbs 0 oz  Body mass index is 32.1 kg/m .         Psychiatric Mental Status Examination:   Appearance: awake, cooperative, lying in bed  Attitude: cooperative and calm  Eye Contact: good  Mood:  \"helpless\"  Affect: mood congruent and restricted  Speech:  clear, coherent and flattened prosody  Language: fluent in English  Psychomotor Behavior:  no evidence of tardive dyskinesia, dystonia, or tics  Gait/Station: Not assessed.   Thought Process:  linear, , goal oriented  Associations:  no loose associations  Thought Content:  Denying SI/HI/AVH; no evidence of psychotic thinking  Insight:  good  Judgement: intact  Oriented to:  time, person, and place  Attention Span and Concentration:  intact  Recent and Remote Memory:  intact  Fund of Knowledge: appropriate    Clinical Global Impressions  First: 7     Most recent: 7            Physical Exam:   Please refer to physical exam completed by ED provider, Hollis Roberts, on 1/23/23. I agree with the findings and assessment and have no additional findings to add at this time.     "

## 2023-01-26 NOTE — PHARMACY-ADMISSION MEDICATION HISTORY
Admission Medication History Completed by Pharmacy    See [facility] Admission Navigator for allergy information, preferred outpatient pharmacy, prior to admission medications and immunization status.     Medication History Sources:     Patient    Electronic fill history    Recent discharge from [facility] 1/11/23-->looked at discharge meds + med history note from there    [facility] unit at [facility] prior to [facility]    Changes made to PTA medication list (reason):    Added: Lithium (started on empath unit)    Deleted: None    Changed: lamotrigine from 150mg daily to 300mg daily (per [facility]/Empath unit); finasteride from 5mg to 1.25mg daily (per [facility] med hx, fill hx); Breo Ellipta 100-25mcg  from 2 puffs BID to 1 puff daily (what patient was getting at [facility])    Additional Information:    The patient was changed from the Advair inhaler to Breo Ellipta within the past year due to insurance coverage. The Advair 115mcg inhaler was 2 puffs twice daily. The equivalent dose for Breo Ellipta 100mcg would be 1 puff daily. The med history at [facility] changed the Breo Ellipta to the patient reporting taking it 2 puffs twice daily. I theorize the patient thought the inhaler would be the same dose/frequency as the previous one. However, Breo Ellipta is not usually dosed that high and would be a significantly higher dose than what the patient had been getting while on Advair. The patient was only on the Breo 100mcg 1 puff daily during admission at [facility], so I edited the entry to reflect this.    Prior to Admission medications    Medication Sig Last Dose Taking? Auth Provider Long Term End Date   albuterol (PROAIR HFA/PROVENTIL HFA/VENTOLIN HFA) 108 (90 Base) MCG/ACT inhaler Inhale 1-2 puffs into the lungs every 4 hours as needed PRN Yes Reported, Patient Yes    BREO ELLIPTA 100-25 MCG/ACT inhaler Inhale 1 puff into the lungs daily 1/25/2023 Yes Reported, Patient     diltiazem ER (DILT-XR) 120 MG  24 hr capsule Take 120 capsules by mouth daily 1/25/2023 Yes Reported, Patient Yes    finasteride (PROSCAR) 5 MG tablet Take 1.25 mg by mouth daily 1/25/2023 Yes Reported, Patient     hydrOXYzine (ATARAX) 25 MG tablet Take 25 mg by mouth 3 times daily as needed 1/25/2023 Yes Reported, Patient     isosorbide mononitrate (IMDUR) 30 MG 24 hr tablet Take 1 tablet by mouth daily 1/25/2023 Yes Reported, Patient Yes    lamoTRIgine (LAMICTAL) 150 MG tablet Take 300 mg by mouth every morning 1/25/2023 Yes Reported, Patient Yes    lidocaine (LIDODERM) 5 % patch Place 1 patch onto the skin daily More than a month Yes Reported, Patient     lithium 300 MG capsule Take 600 mg by mouth 2 times daily (with meals) 1/25/2023 Yes Unknown, Entered By History     loratadine 10 MG capsule Take 10 mg by mouth daily 1/25/2023 Yes Reported, Patient     melatonin 3 MG tablet Take 3 mg by mouth nightly as needed for sleep Past Month Yes Reported, Patient     metFORMIN (GLUCOPHAGE XR) 500 MG 24 hr tablet Take 1,500 mg by mouth every morning 1/25/2023 Yes Reported, Patient Yes    mirtazapine (REMERON) 15 MG tablet Take 15 mg by mouth nightly as needed 1/25/2023 Yes Reported, Patient Yes    Multiple Vitamin (ONE-A-DAY ESSENTIAL) TABS Take 1 tablet by mouth daily More than a month Yes Reported, Patient     propranolol (INDERAL) 20 MG tablet Take 20 mg by mouth 3 times daily 1/25/2023 Yes Reported, Patient Yes    ziprasidone (GEODON) 80 MG capsule Take 80 mg by mouth 2 times daily 1/25/2023 Yes Reported, Patient Yes        Date completed: 01/26/23    Medication history completed by: Yulisa Siddiqui, PharmD, BCPS

## 2023-01-26 NOTE — ED NOTES
"Patient slept most of the evening. He woke up and ate dinner. He watched TV in his chair for awhile. Medication compliant. He requested PRN Ativan and Haldol for anxiety and increased paranoia. Patient now resting out in the milieu without complaints. Denies SI now, but continues to reports delusions of a \"hacker\". Pleasant and cooperative. Patient was provided with an update of IP placement. Patient to transfer to [facility]. Report after 0000. Patient aware. Remains voluntary. Patient's parent's were provided with an update per his request. Paper work signed.   "

## 2023-01-26 NOTE — ED NOTES
EMS arrived. Patient was transferred to [facility] at [place]. Calm and cooperative. Remains voluntary at this time. Paper work and belongings were given to EMS staff. Receiving unit was provided an update of patient's transfer. Transferred off the unit at 0315.

## 2023-01-26 NOTE — PROGRESS NOTES
"CLINICAL NUTRITION SERVICES - ASSESSMENT NOTE     Nutrition Prescription    RECOMMENDATIONS FOR MDs/PROVIDERS TO ORDER:  None at this time    Malnutrition Status:    Severe malnutrition in the context of acute illness.     Recommendations already ordered by Registered Dietitian (RD):  Ensure Enlive (chocolate) at 2 pm    Future/Additional Recommendations:  Monitor PO intake, supplement acceptance, and weight trends     REASON FOR ASSESSMENT  Tim Kelley is a/an 47 year old male assessed by the dietitian for Admission Nutrition Risk Screen for positive (unsure wt loss, decreased appetite)    NUTRITION HISTORY  Pt reports eating 25-50% of usual intake for several weeks PTA.    CURRENT NUTRITION ORDERS  Diet: Regular  Intake/Tolerance: eating well, over 80%, per chart review    Pt reports not eating well since admission due to his trays coming up with food he did not order. He states he has been filing out his menu. Discussed he needs to let staff know if his meal trays are incorrect so the kitchen can send a correct tray. Pt understanding. Pt feels he has adequate options when filling out the menu.    LABS  Labs reviewed 1/27    MEDICATIONS  Medications reviewed  Lithium  Metformin  Remeron    ANTHROPOMETRICS  Height: 188 cm (6' 2\")  Most Recent Weight: 113.4 kg (250 lb)    IBW: 86 kg  BMI: Obesity Grade I BMI 30-34.9  Weight History:   Wt Readings from Last 5 Encounters:   01/26/23 113.4 kg (250 lb)   01/23/23 114.8 kg (253 lb)   10/07/22 128 kg (282 lb 3.2 oz)   04/01/16 100.7 kg (222 lb)   03/23/16 99.8 kg (220 lb)     Care Everywhere:  12/29/22: 115.3 kg (254 lb 1.6 oz)  11/21/22: 122.9 kg (271 lb)  9/25/22: 126.9 kg (279 lb 11.2 oz)  8/26/22: 128.8 kg (284 lb)  5/13/22: 130 kg (286 lb 9.6 oz)    11% wt loss in 3.5 months    Dosing Weight: 93 kg (adjusted BW)    ASSESSED NUTRITION NEEDS  Estimated Energy Needs: 3329-6453 kcals/day (20 - 25 kcals/kg)  Justification: Obese  Estimated Protein Needs: " 74-93 grams protein/day (0.8 - 1 grams of pro/kg)  Justification: Maintenance  Estimated Fluid Needs: 1 mL/kcal  Justification: Maintenance    PHYSICAL FINDINGS  See malnutrition section below.    MALNUTRITION  % Intake: </= 50% for >/= 5 days (severe)  % Weight Loss: > 7.5% in 3 months (severe)  Subcutaneous Fat Loss: None observed  Muscle Loss: None observed  Fluid Accumulation/Edema: None noted  Malnutrition Diagnosis: Severe malnutrition in the context of acute illness.     NUTRITION DIAGNOSIS  Inadequate oral intake related to decreased appetite and altered mental status as evidenced by pt report and 11% wt loss in 3.5 months.      INTERVENTIONS  Implementation  Nutrition Education: Provided education on role of RD in care. Discussed available snacks and supplements to promote PO intake.   Medical food supplement therapy - Ensure at 2 pm     Goals  Patient to consume % of nutritionally adequate meal trays TID, or the equivalent with supplements/snacks.     Monitoring/Evaluation  Progress toward goals will be monitored and evaluated per protocol.    Leann Baer RD, LD  Behavioral Clinical Dietitian  Mental Health Pager (M-F): [phone]  On Call Pager (weekends only): [phone]

## 2023-01-27 PROCEDURE — 250N000013 HC RX MED GY IP 250 OP 250 PS 637: Performed by: PSYCHIATRY & NEUROLOGY

## 2023-01-27 PROCEDURE — 124N000002 HC R&B MH UMMC

## 2023-01-27 PROCEDURE — 250N000013 HC RX MED GY IP 250 OP 250 PS 637: Performed by: STUDENT IN AN ORGANIZED HEALTH CARE EDUCATION/TRAINING PROGRAM

## 2023-01-27 PROCEDURE — 99233 SBSQ HOSP IP/OBS HIGH 50: CPT | Performed by: PSYCHIATRY & NEUROLOGY

## 2023-01-27 RX ORDER — ZIPRASIDONE HYDROCHLORIDE 40 MG/1
40 CAPSULE ORAL 2 TIMES DAILY WITH MEALS
Status: DISCONTINUED | OUTPATIENT
Start: 2023-01-27 | End: 2023-02-01

## 2023-01-27 RX ORDER — BENZTROPINE MESYLATE 1 MG/1
1 TABLET ORAL AT BEDTIME
Status: DISCONTINUED | OUTPATIENT
Start: 2023-01-27 | End: 2023-02-01

## 2023-01-27 RX ORDER — BENZTROPINE MESYLATE 1 MG/ML
2 INJECTION, SOLUTION INTRAMUSCULAR; INTRAVENOUS DAILY PRN
Status: DISCONTINUED | OUTPATIENT
Start: 2023-01-27 | End: 2023-02-09 | Stop reason: HOSPADM

## 2023-01-27 RX ORDER — HALOPERIDOL 5 MG/1
5 TABLET ORAL DAILY
Status: DISCONTINUED | OUTPATIENT
Start: 2023-01-28 | End: 2023-02-03

## 2023-01-27 RX ORDER — BENZTROPINE MESYLATE 1 MG/1
1 TABLET ORAL DAILY
Status: DISCONTINUED | OUTPATIENT
Start: 2023-01-28 | End: 2023-01-30

## 2023-01-27 RX ORDER — BENZTROPINE MESYLATE 1 MG/1
1 TABLET ORAL 2 TIMES DAILY PRN
Status: DISCONTINUED | OUTPATIENT
Start: 2023-01-27 | End: 2023-02-09 | Stop reason: HOSPADM

## 2023-01-27 RX ADMIN — ZIPRASIDONE HYDROCHLORIDE 40 MG: 40 CAPSULE ORAL at 09:20

## 2023-01-27 RX ADMIN — METFORMIN HYDROCHLORIDE 1500 MG: 750 TABLET, EXTENDED RELEASE ORAL at 09:09

## 2023-01-27 RX ADMIN — FLUTICASONE FUROATE AND VILANTEROL TRIFENATATE 1 PUFF: 100; 25 POWDER RESPIRATORY (INHALATION) at 09:09

## 2023-01-27 RX ADMIN — BENZTROPINE MESYLATE 1 MG: 1 TABLET ORAL at 21:23

## 2023-01-27 RX ADMIN — PROPRANOLOL HYDROCHLORIDE 20 MG: 10 TABLET ORAL at 09:03

## 2023-01-27 RX ADMIN — PROPRANOLOL HYDROCHLORIDE 20 MG: 10 TABLET ORAL at 19:37

## 2023-01-27 RX ADMIN — LAMOTRIGINE 300 MG: 150 TABLET ORAL at 09:03

## 2023-01-27 RX ADMIN — Medication 7.5 MG: at 21:23

## 2023-01-27 RX ADMIN — MIRTAZAPINE 15 MG: 15 TABLET, FILM COATED ORAL at 21:23

## 2023-01-27 RX ADMIN — LITHIUM CARBONATE 300 MG: 150 CAPSULE, GELATIN COATED ORAL at 18:44

## 2023-01-27 RX ADMIN — PROPRANOLOL HYDROCHLORIDE 20 MG: 10 TABLET ORAL at 13:44

## 2023-01-27 RX ADMIN — LORATADINE 10 MG: 10 TABLET ORAL at 09:03

## 2023-01-27 RX ADMIN — ZIPRASIDONE HYDROCHLORIDE 40 MG: 40 CAPSULE ORAL at 18:44

## 2023-01-27 RX ADMIN — LITHIUM CARBONATE 300 MG: 150 CAPSULE, GELATIN COATED ORAL at 09:03

## 2023-01-27 RX ADMIN — DILTIAZEM HYDROCHLORIDE 120 MG: 120 CAPSULE, COATED, EXTENDED RELEASE ORAL at 09:09

## 2023-01-27 RX ADMIN — ISOSORBIDE MONONITRATE 30 MG: 30 TABLET, EXTENDED RELEASE ORAL at 09:03

## 2023-01-27 ASSESSMENT — ACTIVITIES OF DAILY LIVING (ADL)
LAUNDRY: UNABLE TO COMPLETE
ADLS_ACUITY_SCORE: 30
DRESS: INDEPENDENT
HYGIENE/GROOMING: INDEPENDENT
HYGIENE/GROOMING: INDEPENDENT
ADLS_ACUITY_SCORE: 30
DRESS: INDEPENDENT
ADLS_ACUITY_SCORE: 30
ADLS_ACUITY_SCORE: 30
LAUNDRY: WITH SUPERVISION
ORAL_HYGIENE: INDEPENDENT
ADLS_ACUITY_SCORE: 30
ORAL_HYGIENE: INDEPENDENT
ADLS_ACUITY_SCORE: 30

## 2023-01-27 NOTE — PLAN OF CARE
Problem: Sleep Disturbance  Goal: Adequate Sleep/Rest  Outcome: Progressing   Goal Outcome Evaluation:    Pt appeared to sleep 6.75 hours overnight with no problems observed during safety checks. No concerns reported. No prns administered. Will continue to monitor and assess pt safety, mental status, and any medical concerns.

## 2023-01-27 NOTE — DISCHARGE INSTRUCTIONS
Behavioral Discharge Planning and Instructions    Summary:  You were admitted on 1/26/2023 due to Psychotic Symptomology.  You were treated by Dr. Gala Enamorado MD and were provisionally discharged on 2/9/2023 from [facility] to Group Home.  Primary Diagnoses:   Schizoaffective disorder, depressed type    Mental Health Follow-Up:  - You will be discharging to your Group Home: [facility] (Your Father is transporting you and will pick you up at 12pm on the unit)   [address] [facility] [facility] [address]  [facility] : Dasia ([phone])  [facility] Group Home Nurse: Anthony ([phone])    - Outpatient Psychiatry Follow-up:  Appointment:  Friday, February 10th, @ 2:30pm w/ Dr. Greg Mensah (This is an in-person appointment)  [facility],   [address] [place] [address]  Phone: [phone]    - Outpatient Therapist Follow-up  Appointment: Monday, February 13th @ 10am with Matthew Emery (This is a phone appointment!!)  [facility]  Phone: [phone]    Other providers to follow-up with   Primary Care Provider: Dr. Lonny Wright, [facility] ([phone])  Mental Health : Chaitanya Cuevas at [facility] ([phone])  CADI : Nalini at [place] ([phone])    Attend all scheduled appointments with your outpatient providers. Call at least 24 hours in advance if you need to reschedule an appointment to ensure continued access to your outpatient providers.   Major Treatments, Procedures and Findings:  You were provided with: a psychiatric assessment, medication evaluation and/or management, group therapy and milieu management    Symptoms to Report: feeling more aggressive, increased confusion, losing more sleep, mood getting worse or thoughts of suicide    Early warning signs can include: increased depression or anxiety sleep disturbances increased thoughts or behaviors of suicide  "or self-harm  increased unusual thinking, such as paranoia or hearing voices    Safety and Wellness:  Take all medicines as directed.  Make no changes unless your doctor suggests them. Follow treatment recommendations.  Refrain from alcohol and non-prescribed drugs.  Ask your support system to help you reduce your access to items that could harm yourself or others. Items could include:  Firearms  Medicines (both prescribed and over-the-counter)  Knives and other sharp objects  Ropes and like materials  Car keys  If there is a concern for safety, call 911. If there is a concern for safety, call 911.    Resources:   Crisis Intervention: 734.429.5818 or 114-449-4980 (TTY: 950.687.1504).  Call anytime for help.  National [place] on Mental Illness (www.[place].charity.org): 977.397.4684 or 997-161-2620.  [place] Association for Children's Mental Health (www.mac.org): 393.731.8916.  Alcoholics Anonymous (www.alcoholics-anonymous.org): Check your phone book for your local chapter.  Suicide Awareness Voices of Education (SAVE) (www.save.org): 382-713-GBQK (1216)  National Suicide Prevention Line (www.mentalhealthmn.org): 366-981-QGJE (9608)  Mental Health Consumer/Survivor Network of [place] (www.mhcsn.net): 964.826.4429 or 152-810-4551  Mental Health Association of [place] (www.mentalhealth.org): 895.882.9707 or 952-566-6289  Self- Management and Recovery Training., SMART-- Toll free: 589.600.8416  www.stickK.org  [facility] Crisis (COPE) Response - Adult 998 825-9545  [facility] Crisis Response - Adult 599 339-5227  Text 4 Life: txt \"LIFE\" to 77762 for immediate support and crisis intervention  Crisis text line: Text \"[place]\" to 016968. Free, confidential, 24/7.  Crisis Intervention: 141.249.1620 or 339-866-4325. Call anytime for help.   [facility] Crisis Team - Child: [phone]  [place] [facility] Crisis Response Team - Child: [phone]    The treatment team has " appreciated the opportunity to work with you. If you have any questions or concerns our unit number is [phone].

## 2023-01-27 NOTE — PLAN OF CARE
Problem: Adult Behavioral Health Plan of Care  Goal: Plan of Care Review  Outcome: Progressing  Flowsheets (Taken 1/26/2023 1730)  Patient Agreement with Plan of Care: agrees   Goal Outcome Evaluation:    Plan of Care Reviewed With: patient          Pt spent majority of his evening in his room just lying in bed or napping the TV on. Came out for dinner. Asked  and got a cheese burger instead of the entree he got, which, pt states he had for lunch due to not filling out a menu. . He was provided a menu to fill out for tomorrow. Pt was encouraged to come out and participate in unit activities.     Pt was compliant with med and cares. Appetite 100%.  Performed personal hygiene with encouragement.     Parents visited. Mom would like a call back from the [facility]  and would like to speak to the psychiatrist as well.       Pt denies anxiety, depression or hallucinations. No delusional statements or behaviors.

## 2023-01-27 NOTE — PROGRESS NOTES
"[facility], [place]   Psychiatric Progress Note  Hospital Day: 1        Interim History:   The patient's care was discussed with the treatment team during the daily team meeting and/or staff's chart notes were reviewed.  Staff report patient has remained isolative to his room. Patient did not report any acute medical concerns or side effects. No behavioral issues overnight, including violent or aggressive behaviors. Patient did not require seclusion or restraints. Patient is exhibiting signs/sx of psychosis or song. Patient is medication adherent. Patient is not attending groups. Patient is sleeping well. Patient is eating adequately. Patient is attending to ADLs with prompting.    Upon interview, the patient was lying in bed. Appeared tense and anxious. He asked \"Am I ever going to get better?\" He expressed feelings of hopelessness as he has been hospitalized on multiple occasions with perceived lack of improvement despite several treatment interventions. I explained that we have several treatment options and that I am optimistic he will improve. He does feel that his current medication regimen, including Haldol, was helpful for sleep last evening. He asked for a higher dose of Haldol this evening and denies side effects. He is amenable with plan to increase at bedtime dose to 7.5 mg and add Haldol 5 mg daily while monitoring closely for EPSE.    Suicidal ideation: denies current SI. Notes that last SI thought was \"a few days ago.\" When asked to describe the thought, he replied \"That I wanted to commit suicide.\" Denies that he had a plan and denies current intent or plan. Stated that if he feels unsafe in the hospital, he will notify staff. Reports severe depressive symptoms since 2015.     Homicidal ideation: denies current or recent homicidal ideation or behaviors. \"Never\"    Psychotic symptoms: Patient denies AH and VH. Denies ideas of reference. Is still very concerned that the " "\"hackers\" are after him/out to get him. He said that they have hacked into several accounts on his phone. He does not know why they are targeting him. Has no concerns about other staff/patients on the unit.     Medication side effects reported: No significant side effects.    Acute medical concerns: none    Other issues reported by patient: Patient had no further questions or concerns.           Medications:       haloperidol  5 mg Oral At Bedtime    And     benztropine  1 mg Oral At Bedtime     diltiazem ER COATED BEADS  120 mg Oral Daily     fluticasone-vilanterol  1 puff Inhalation Daily     isosorbide mononitrate  30 mg Oral Daily     lamoTRIgine  300 mg Oral Daily     lithium  300 mg Oral BID w/meals     loratadine  10 mg Oral Daily     metFORMIN  1,500 mg Oral Daily with breakfast     mirtazapine  15 mg Oral At Bedtime     propranolol  20 mg Oral TID          Allergies:     Allergies   Allergen Reactions     No Known Drug Allergies           Labs:   No results found for this or any previous visit (from the past 24 hour(s)).       Psychiatric Examination:     /73   Pulse 66   Temp 98.2  F (36.8  C) (Oral)   Resp 18   Ht 1.88 m (6' 2\")   Wt 113.4 kg (250 lb)   SpO2 95%   BMI 32.10 kg/m    Weight is 250 lbs 0 oz  Body mass index is 32.1 kg/m .    Weight over time:  Vitals:    01/26/23 0357   Weight: 113.4 kg (250 lb)       Orthostatic Vitals     None            Cardiometabolic risk assessment. 01/27/23      Reviewed patient profile for cardiometabolic risk factors    Date taken /Value  REFERENCE RANGE   Abdominal Obesity  (Waist Circumference)   See nursing flowsheet Women ?35 in (88 cm)   Men ?40 in (102 cm)      Triglycerides  Triglycerides   Date Value Ref Range Status   02/21/2005 130 0 - 150 mg/dL Final       ?150 mg/dL (1.7 mmol/L) or current treatment for elevated triglycerides   HDL cholesterol  HDL Cholesterol   Date Value Ref Range Status   02/21/2005 42 >40 mg/dL Final   ]   Women <50 " "mg/dL (1.3 mmol/L) in women or current treatment for low HDL cholesterol  Men <40 mg/dL (1 mmol/L) in men or current treatment for low HDL cholesterol     Fasting plasma glucose (FPG) Lab Results   Component Value Date    GLC 94 01/24/2023    GLC 93 04/01/2016      FPG ?100 mg/dL (5.6 mmol/L) or treatment for elevated blood glucose   Blood pressure  BP Readings from Last 3 Encounters:   01/26/23 108/73   01/26/23 137/85   10/07/22 122/86    Blood pressure ?130/85 mmHg or treatment for elevated blood pressure   Family History  See family history     Appearance: awake, cooperative, lying in bed  Attitude: cooperative and calm  Eye Contact: good  Mood:  \"hopeless\"  Affect: mood congruent and anxious, heightened intensity, slightly reactive  Speech:  clear, coherent and flattened prosody  Language: fluent in English  Psychomotor Behavior:  Slight involuntary movements of hands bilaterally. No other evidence of tardive dyskinesia, dystonia, or tics  Gait/Station: normal  Thought Process:  linear, illogical, goal oriented  Associations:  no loose associations  Thought Content:  Denying SI/HI/AVH; evidence of severe paranoia  Insight:  fair  Judgement: fair  Oriented to:  time, person, and place  Attention Span and Concentration: fair  Recent and Remote Memory:  intact  Fund of Knowledge: appropriate     Clinical Global Impressions  First:7     Most recent:6              Precautions:     Behavioral Orders   Procedures     Code 1 - Restrict to Unit     Routine Programming     As clinically indicated     Status 15     Every 15 minutes.          Diagnoses:     F25.1 Schizoaffective disorder, depressed type  F41.1 Generalized anxiety disorder  F15.90 Stimulant use disorder, severe, dependence - by history  Prolonged QTc         Assessment & Plan:     Assessment and hospital summary:  This patient is a 47 year old  male with history of schizoaffective disorder, bipolar disorder and stimulant use disorder who presented " to ED with paranoid ideation and delusions in context of medication non-adherence and increased psychosocial stressors. Patient was initially admitted to the [facility] unit, then transferred to station 12 due to paranoid delusion and severe anxiety posing a safety risk. Symptoms and presentation at this time is most consistent with Schizoaffective disorder. I have discussed the risks, benefits, and alternatives of psychiatric hospitalization and medication treatment, including both Haldol and clozapine. Patient is amenable to a trial of haloperidol. He wishes to avoid antipsychotics with a high likelihood of weight gain. Patient will likely benefit from close psychiatric follow up upon discharge.      Inpatient psychiatric hospitalization is warranted at this time for safety, stabilization, and possible adjustment in medications.    Target psychiatric symptoms and interventions:  Will taper and discontinue Geodon due to suspected EPSE and prolonged QTc  Increase haloperidol to 7.5 mg at bedtime, administer with benztropine 1 mg at bedtime. Beginning tomorrow, will add 5 mg daily along with Cogentin 1 mg daily.  Add oral Cogentin 1 mg BID prn for EPSE and IM cogentin prn for any evidence of acute dystonic reaction  Continue hydroxyzine 25 mg q4h prn for acute anxiety  Continue lamotrigine 300 mg qday  Continue lithium 300 mg BID. Initiated in [facility] unit. Will obtain Li level on Monday.   Continue mirtazapine 15 mg at bedtime  Continue propranolol 20 mg tid     Risks, benefits, and alternatives discussed at length with patient.     Acute Medical Problems and Treatments:  Prolonged QTc: QTc 472 on 1/24/23 in context of taking Geodon total of 160 mg daily. Likely 2/2 Geodon. Will repeat EKG prior to discharge.     Monitor closely for evidence of EPSE. Cogentin available.     Behavioral/Psychological/Social:  - Encourage unit programming    Safety:  - Continue precautions as noted above  - Status 15 minute checks    Legal  Status: voluntary    Disposition Plan   Reason for ongoing admission: is unable to care for self due to severe psychosis or song  Discharge location: group home  Discharge Medications: not ordered  Follow-up Appointments: not scheduled    Entered by: Gala Enamorado MD on 1/27/2023 at 8:14 AM

## 2023-01-27 NOTE — PLAN OF CARE
Goal Outcome Evaluation:      Plan of Care Reviewed With: patient    Overall Patient Progress:  (Initial)Overall Patient Progress:  (Initial)    Outcome Evaluation: Pt to consume at least 75% of meals TID and use Ensure once daily to meet nutrition needs.

## 2023-01-27 NOTE — PLAN OF CARE
"Nursing Assessment    Recent Vitals: B/P: 122/89, T: 98, P: 85    Sleep:  Hours of sleep at night: 6.75    General Shift Summary  Patient has primarily been resting in bed. When in the milieu he mainly paces the sharpe. He presents as withdrawn and hasn't been seen interacting with peers. Patient has a flat affect and there is poverty of content in speech. He denied SI/HI/AVH/SIB. He voiced having anxiety and depression both rated at 7/10. He denied any triggers and stated these are \"my normal levels\". Patient said he was interested in groups. Writer reminded him when group was about to occur however patient remained in his room laying in bed. Hygiene is poor, he presents as untidy. He is eating well at over 80%. Incite and judgement are poor.    Patient is medication compliant with no voiced side effects. He denied pain.    Plan is to continue to monitor patient status q 15 mins, assess response to medications, and maintain the patients safety.    Kizzy Morris RN MSN  "

## 2023-01-27 NOTE — PLAN OF CARE
Assessment/Intervention/Current Symtoms and Care Coordination  - Refer to psychosocial completed on 1/26/2023 for assessment/social functioning  - Chart review  - Team Meeting     - Writer had lengthy conversation with with pt's Mother Carito Kelley.  She asked a lot of questions about medications, his care etc.  She wanted to know about transferring him because his room is not accommodating etc.  She asked if he was on suicide watch. I explained our 15 min checks etc.  She also asked if there are any studies happening right now that he could be involved in. She asked to speak with the doctor further about medications. I passed this information onto Dr. Enamorado.  Mother is not available the rest of the day, but would like a call on Monday or Tuesday.  Phone: [phone]    He will return to group home upon stabilization     Discharge Plan or Goal  Pending stabilization & development of a safe discharge plan.  Considerations include: Return to group home    Barriers to Discharge  Patient requires further psychiatric stabilization due to current symptomology    Referral Status  Psychiatry and [facility] Case Management    Legal Status  Patient is voluntary     Contacts:  Outpatient Psychiatrist: Dr. Greg Mensah, [facility] ([phone])  Outpatient Therapist: Matthew Emery, [facility], [phone]  Primary Care Provider: Dr. Lonny Wright, [facility] ([phone])  Mental Health : Chaitanya Cuevas at [facility] ([phone])  CADI : Nalini at [facility] ([phone])  [facility] : [place] ([phone])  [facility] Group Home Nurse: Anthony ([phone])

## 2023-01-28 PROCEDURE — 124N000002 HC R&B MH UMMC

## 2023-01-28 PROCEDURE — 250N000013 HC RX MED GY IP 250 OP 250 PS 637: Performed by: PSYCHIATRY & NEUROLOGY

## 2023-01-28 PROCEDURE — 250N000013 HC RX MED GY IP 250 OP 250 PS 637: Performed by: STUDENT IN AN ORGANIZED HEALTH CARE EDUCATION/TRAINING PROGRAM

## 2023-01-28 PROCEDURE — 250N000011 HC RX IP 250 OP 636: Performed by: PSYCHIATRY & NEUROLOGY

## 2023-01-28 RX ORDER — ONDANSETRON 4 MG/1
4 TABLET, ORALLY DISINTEGRATING ORAL EVERY 6 HOURS PRN
Status: DISCONTINUED | OUTPATIENT
Start: 2023-01-28 | End: 2023-02-09 | Stop reason: HOSPADM

## 2023-01-28 RX ORDER — LOPERAMIDE HCL 2 MG
2 CAPSULE ORAL 4 TIMES DAILY PRN
Status: DISCONTINUED | OUTPATIENT
Start: 2023-01-28 | End: 2023-02-09 | Stop reason: HOSPADM

## 2023-01-28 RX ADMIN — PROPRANOLOL HYDROCHLORIDE 20 MG: 10 TABLET ORAL at 15:06

## 2023-01-28 RX ADMIN — HALOPERIDOL 5 MG: 5 TABLET ORAL at 08:35

## 2023-01-28 RX ADMIN — ZIPRASIDONE HYDROCHLORIDE 40 MG: 40 CAPSULE ORAL at 18:04

## 2023-01-28 RX ADMIN — PROPRANOLOL HYDROCHLORIDE 20 MG: 10 TABLET ORAL at 08:35

## 2023-01-28 RX ADMIN — BENZTROPINE MESYLATE 1 MG: 1 TABLET ORAL at 08:34

## 2023-01-28 RX ADMIN — Medication 7.5 MG: at 21:14

## 2023-01-28 RX ADMIN — ONDANSETRON 4 MG: 4 TABLET, ORALLY DISINTEGRATING ORAL at 13:17

## 2023-01-28 RX ADMIN — ISOSORBIDE MONONITRATE 30 MG: 30 TABLET, EXTENDED RELEASE ORAL at 08:34

## 2023-01-28 RX ADMIN — MIRTAZAPINE 15 MG: 15 TABLET, FILM COATED ORAL at 21:13

## 2023-01-28 RX ADMIN — LOPERAMIDE HYDROCHLORIDE 2 MG: 2 CAPSULE ORAL at 13:17

## 2023-01-28 RX ADMIN — FLUTICASONE FUROATE AND VILANTEROL TRIFENATATE 1 PUFF: 100; 25 POWDER RESPIRATORY (INHALATION) at 08:37

## 2023-01-28 RX ADMIN — ZIPRASIDONE HYDROCHLORIDE 40 MG: 40 CAPSULE ORAL at 08:35

## 2023-01-28 RX ADMIN — LITHIUM CARBONATE 300 MG: 150 CAPSULE, GELATIN COATED ORAL at 08:35

## 2023-01-28 RX ADMIN — PROPRANOLOL HYDROCHLORIDE 20 MG: 10 TABLET ORAL at 21:13

## 2023-01-28 RX ADMIN — LORATADINE 10 MG: 10 TABLET ORAL at 08:35

## 2023-01-28 RX ADMIN — DILTIAZEM HYDROCHLORIDE 120 MG: 120 CAPSULE, COATED, EXTENDED RELEASE ORAL at 08:35

## 2023-01-28 RX ADMIN — LAMOTRIGINE 300 MG: 150 TABLET ORAL at 08:35

## 2023-01-28 RX ADMIN — BENZTROPINE MESYLATE 1 MG: 1 TABLET ORAL at 21:13

## 2023-01-28 RX ADMIN — METFORMIN HYDROCHLORIDE 1500 MG: 750 TABLET, EXTENDED RELEASE ORAL at 08:35

## 2023-01-28 ASSESSMENT — ACTIVITIES OF DAILY LIVING (ADL)
ADLS_ACUITY_SCORE: 30
ADLS_ACUITY_SCORE: 30
HYGIENE/GROOMING: INDEPENDENT
ADLS_ACUITY_SCORE: 30
DRESS: INDEPENDENT
ADLS_ACUITY_SCORE: 30
LAUNDRY: UNABLE TO COMPLETE
ADLS_ACUITY_SCORE: 30
ORAL_HYGIENE: INDEPENDENT
ADLS_ACUITY_SCORE: 30

## 2023-01-28 NOTE — PLAN OF CARE
Problem: Sleep Disturbance  Goal: Adequate Sleep/Rest  Outcome: Progressing   Goal Outcome Evaluation:    Pt appeared to sleep 7 hours overnight with no problems observed during safety checks. No concerns reported. No prns administered. Will continue to monitor and assess pt safety, mental status, and any medical concerns.

## 2023-01-28 NOTE — PLAN OF CARE
Nursing Assessment    Recent Vitals: B/P: 116/77, T: 98.1, P: 73, R: 16     Sleep:  Hours of sleep at night: 7    General Shift Summary  Patient has isolated to his room. He is withdrawn and presents with a flat to depressed affect. Patient voiced having anxiety and depression and rated both as an 8/10, he stated his normal triggers were playing part into his symptoms. Patient didn't discuss his current triggers. He denied SI/HI/AVH/SIB. He stated he feels safe in the hospital but didn't know about outside of the hospital. Hygiene is untidy, writer encouraged showering, patient didn't respond. Patient ate about 50% of his breakfast. He refused lunch. When assessing patient he voiced feeling nauseous and having diarrhea. Patient received Imodium and Zofran after 1300, will continue to monitor its effects.    Patient is medication cooperative. Having abdominal pain related to upset stomach.    Plan is to continue to monitor patient status q 15 mins, assess response to medications, and maintain the patients safety.    Kizzy Morris, RN MSN

## 2023-01-28 NOTE — PLAN OF CARE
"  Problem: Adult Behavioral Health Plan of Care  Goal: Adheres to Safety Considerations for Self and Others  Outcome: Progressing  Intervention: Develop and Maintain Individualized Safety Plan  Recent Flowsheet Documentation  Taken 1/27/2023 1651 by Magdiel North RN  Safety Measures:    suicide check-in completed    safety rounds completed   Goal Outcome Evaluation:    Plan of Care Reviewed With: patient          Pt mostly isolative in his room and watching television in his room and refused participating in group activities despite encouragement from this writer and pt stated he try to attend group tomorrow. Pt endorsed only anxiety 4/10 and stated this his usual normal. Pt denies depression, SIB/SI/HI, and AVH and contracted for safety. Pt speech clear and coherent, good eye contact and able to make needs known. Present with flat and blunted affect, calm and polite and cooperated with medications and assessment. Good appetite and adequate fluid intake, voiding freely and no BM issue per pt. Vital stable and denies shortness of breath. /73 (BP Location: Left arm)   Pulse 72   Temp 98  F (36.7  C) (Oral)   Resp 18   Ht 1.88 m (6' 2\")   Wt 113.4 kg (250 lb)   SpO2 97%   BMI 32.10 kg/m             "

## 2023-01-28 NOTE — PROGRESS NOTES
Patient was provided with a water pitcher and educated on the importance of increased fluid intake, especially when having diarrhea and being on Lithium. Patient said he felt the medications helped slightly with his abdominal cramping however he was still have mild cramps. No further episodes of diarrhea and no episodes of vomiting. No vision changes, hand tremors, or confusion noted. Will continue to monitor patient.

## 2023-01-29 PROCEDURE — 250N000013 HC RX MED GY IP 250 OP 250 PS 637: Performed by: PSYCHIATRY & NEUROLOGY

## 2023-01-29 PROCEDURE — 250N000013 HC RX MED GY IP 250 OP 250 PS 637: Performed by: STUDENT IN AN ORGANIZED HEALTH CARE EDUCATION/TRAINING PROGRAM

## 2023-01-29 PROCEDURE — 124N000002 HC R&B MH UMMC

## 2023-01-29 RX ADMIN — LAMOTRIGINE 300 MG: 150 TABLET ORAL at 08:36

## 2023-01-29 RX ADMIN — BENZTROPINE MESYLATE 1 MG: 1 TABLET ORAL at 21:08

## 2023-01-29 RX ADMIN — LITHIUM CARBONATE 300 MG: 150 CAPSULE, GELATIN COATED ORAL at 08:36

## 2023-01-29 RX ADMIN — MIRTAZAPINE 15 MG: 15 TABLET, FILM COATED ORAL at 21:09

## 2023-01-29 RX ADMIN — PROPRANOLOL HYDROCHLORIDE 20 MG: 10 TABLET ORAL at 14:01

## 2023-01-29 RX ADMIN — HALOPERIDOL 5 MG: 5 TABLET ORAL at 08:36

## 2023-01-29 RX ADMIN — DILTIAZEM HYDROCHLORIDE 120 MG: 120 CAPSULE, COATED, EXTENDED RELEASE ORAL at 08:36

## 2023-01-29 RX ADMIN — FLUTICASONE FUROATE AND VILANTEROL TRIFENATATE 1 PUFF: 100; 25 POWDER RESPIRATORY (INHALATION) at 08:37

## 2023-01-29 RX ADMIN — METFORMIN HYDROCHLORIDE 1500 MG: 750 TABLET, EXTENDED RELEASE ORAL at 08:36

## 2023-01-29 RX ADMIN — PROPRANOLOL HYDROCHLORIDE 20 MG: 10 TABLET ORAL at 21:09

## 2023-01-29 RX ADMIN — Medication 7.5 MG: at 21:09

## 2023-01-29 RX ADMIN — LORATADINE 10 MG: 10 TABLET ORAL at 08:36

## 2023-01-29 RX ADMIN — ZIPRASIDONE HYDROCHLORIDE 40 MG: 40 CAPSULE ORAL at 08:37

## 2023-01-29 RX ADMIN — PROPRANOLOL HYDROCHLORIDE 20 MG: 10 TABLET ORAL at 08:36

## 2023-01-29 RX ADMIN — LITHIUM CARBONATE 300 MG: 150 CAPSULE, GELATIN COATED ORAL at 17:48

## 2023-01-29 RX ADMIN — BENZTROPINE MESYLATE 1 MG: 1 TABLET ORAL at 08:37

## 2023-01-29 RX ADMIN — ZIPRASIDONE HYDROCHLORIDE 40 MG: 40 CAPSULE ORAL at 17:48

## 2023-01-29 RX ADMIN — ISOSORBIDE MONONITRATE 30 MG: 30 TABLET, EXTENDED RELEASE ORAL at 08:36

## 2023-01-29 ASSESSMENT — ACTIVITIES OF DAILY LIVING (ADL)
ADLS_ACUITY_SCORE: 30
LAUNDRY: UNABLE TO COMPLETE
ADLS_ACUITY_SCORE: 30
DRESS: SCRUBS (BEHAVIORAL HEALTH)
ADLS_ACUITY_SCORE: 30
ORAL_HYGIENE: INDEPENDENT
HYGIENE/GROOMING: HANDWASHING;SHOWER;INDEPENDENT
ADLS_ACUITY_SCORE: 30

## 2023-01-29 NOTE — PLAN OF CARE
"Nursing Assessment    Recent Vitals: B/P: 107/75, T: 97.8, P: 74    Sleep:  Hours of sleep at night: 7    General Shift Summary  Patient has primarily isolated to his room coming out briefly for meals. He presents with a flat affect however is calm and cooperative. Patient denied SI/HI/AVH/SIB. He voiced having mild depression and anxiety. Patient stated he is feeling less anxious compared to yesterday. Hygiene is good. He is eating well at over 80% of his meals. He had a visit with his family, visit went well. Concentration is good. Incite and judgment are good.    Writer asked patient prior to giving him his morning medications if he was having any GI symptoms or feeling mentally altered. Patient denied stating \"No, I'm feeling really good, this makes me wonder if those symptoms were caused by the Lithium.\". Writer asked patient if he would like to take his morning Lithium knowing that he refused it last night. He stated \"Yes, I'd like to take my Lithium this way if I have symptoms later I'll know this might be due to the Lithium. I'm really wanting levels taken too.\". Patient was provided reassurance he is having labs taken tomorrow and it is best to take levels prior to ones morning dose. He was thankful.    When assessing patient later he stated that he continues to have no GI symptoms. He voiced having a dry mouth but said that after increasing fluids it went away.    Patient was medication compliant. Denies pain.    Plan is to continue to monitor patient status q 15 mins, assess response to medications, and maintain the patients safety.    Kizzy Morris, RN MSN  "

## 2023-01-29 NOTE — PLAN OF CARE
"  Problem: Depressive Signs/Symptoms  Goal: Optimized Energy Level (Depressive Signs/Symptoms)  Outcome: Not Progressing   Goal Outcome Evaluation:      Continued to isolated throughout the evening shift. He declined his evening lithium. He stated he did not feel well after taking it this morning so he is going to hold off on all doses until he speaks with the MD. He did not come out of his room. He had all meals delivered. He stated he was \"just feeling very tired,\" and prefers to sleep the remainder of the evening. His affect is flat/depressed. Endorses anxiety. No physical complaints tonight.   Compliant with unit safety.                   "

## 2023-01-30 LAB
CREAT SERPL-MCNC: 1.11 MG/DL (ref 0.66–1.25)
GFR SERPL CREATININE-BSD FRML MDRD: 82 ML/MIN/1.73M2
HOLD SPECIMEN: NORMAL
LITHIUM SERPL-SCNC: 0.4 MMOL/L

## 2023-01-30 PROCEDURE — 99233 SBSQ HOSP IP/OBS HIGH 50: CPT | Performed by: PSYCHIATRY & NEUROLOGY

## 2023-01-30 PROCEDURE — 82565 ASSAY OF CREATININE: CPT | Performed by: PSYCHIATRY & NEUROLOGY

## 2023-01-30 PROCEDURE — 124N000002 HC R&B MH UMMC

## 2023-01-30 PROCEDURE — 250N000013 HC RX MED GY IP 250 OP 250 PS 637: Performed by: STUDENT IN AN ORGANIZED HEALTH CARE EDUCATION/TRAINING PROGRAM

## 2023-01-30 PROCEDURE — 80178 ASSAY OF LITHIUM: CPT | Performed by: PSYCHIATRY & NEUROLOGY

## 2023-01-30 PROCEDURE — 36415 COLL VENOUS BLD VENIPUNCTURE: CPT | Performed by: PSYCHIATRY & NEUROLOGY

## 2023-01-30 PROCEDURE — 250N000013 HC RX MED GY IP 250 OP 250 PS 637: Performed by: PSYCHIATRY & NEUROLOGY

## 2023-01-30 RX ORDER — SALIVA STIMULANT COMB. NO.3
2 SPRAY, NON-AEROSOL (ML) MUCOUS MEMBRANE 4 TIMES DAILY PRN
Status: DISCONTINUED | OUTPATIENT
Start: 2023-01-30 | End: 2023-02-09 | Stop reason: HOSPADM

## 2023-01-30 RX ORDER — BENZTROPINE MESYLATE 0.5 MG/1
0.5 TABLET ORAL DAILY
Status: DISCONTINUED | OUTPATIENT
Start: 2023-01-31 | End: 2023-02-03

## 2023-01-30 RX ADMIN — METFORMIN HYDROCHLORIDE 1500 MG: 750 TABLET, EXTENDED RELEASE ORAL at 09:04

## 2023-01-30 RX ADMIN — LITHIUM CARBONATE 300 MG: 150 CAPSULE, GELATIN COATED ORAL at 18:12

## 2023-01-30 RX ADMIN — BENZTROPINE MESYLATE 1 MG: 1 TABLET ORAL at 21:50

## 2023-01-30 RX ADMIN — ZIPRASIDONE HYDROCHLORIDE 40 MG: 40 CAPSULE ORAL at 18:12

## 2023-01-30 RX ADMIN — LAMOTRIGINE 300 MG: 150 TABLET ORAL at 09:03

## 2023-01-30 RX ADMIN — BENZTROPINE MESYLATE 1 MG: 1 TABLET ORAL at 09:02

## 2023-01-30 RX ADMIN — LORATADINE 10 MG: 10 TABLET ORAL at 09:04

## 2023-01-30 RX ADMIN — MIRTAZAPINE 15 MG: 15 TABLET, FILM COATED ORAL at 21:50

## 2023-01-30 RX ADMIN — PROPRANOLOL HYDROCHLORIDE 20 MG: 10 TABLET ORAL at 09:03

## 2023-01-30 RX ADMIN — Medication 7.5 MG: at 21:50

## 2023-01-30 RX ADMIN — FLUTICASONE FUROATE AND VILANTEROL TRIFENATATE 1 PUFF: 100; 25 POWDER RESPIRATORY (INHALATION) at 09:06

## 2023-01-30 RX ADMIN — PROPRANOLOL HYDROCHLORIDE 20 MG: 10 TABLET ORAL at 14:11

## 2023-01-30 RX ADMIN — HALOPERIDOL 5 MG: 5 TABLET ORAL at 09:04

## 2023-01-30 RX ADMIN — DILTIAZEM HYDROCHLORIDE 120 MG: 120 CAPSULE, COATED, EXTENDED RELEASE ORAL at 09:03

## 2023-01-30 RX ADMIN — ISOSORBIDE MONONITRATE 30 MG: 30 TABLET, EXTENDED RELEASE ORAL at 09:02

## 2023-01-30 RX ADMIN — ZIPRASIDONE HYDROCHLORIDE 40 MG: 40 CAPSULE ORAL at 09:02

## 2023-01-30 RX ADMIN — LITHIUM CARBONATE 300 MG: 150 CAPSULE, GELATIN COATED ORAL at 09:03

## 2023-01-30 ASSESSMENT — ACTIVITIES OF DAILY LIVING (ADL)
ADLS_ACUITY_SCORE: 30
ORAL_HYGIENE: INDEPENDENT
ADLS_ACUITY_SCORE: 30
HYGIENE/GROOMING: INDEPENDENT
LAUNDRY: WITH SUPERVISION
ADLS_ACUITY_SCORE: 30
DRESS: INDEPENDENT
ADLS_ACUITY_SCORE: 30

## 2023-01-30 NOTE — PROGRESS NOTES
"[facility], [place]   Psychiatric Progress Note  Hospital Day: 4        Interim History:   The patient's care was discussed with the treatment team during the daily team meeting and/or staff's chart notes were reviewed.  Staff report patient has remained isolative to his room. Patient did not report any acute medical concerns or side effects with exception of loose stools. No behavioral issues overnight, including violent or aggressive behaviors. Patient did not require seclusion or restraints. Patient is exhibiting signs/sx of psychosis or song. Patient is medication adherent. Patient is not attending groups. Patient is sleeping well. Patient is eating adequately. Patient is attending to ADLs with prompting. He is isolative to his room. Per RN staff, \"Patient demonstrating less paranoia and improved insight into fears of hackers, able to talk with RN writer about how he is starting to understand that his fears were somewhat exaggerated. Patient engaged in conversation with writer about the current problems with electronic Phishing scams and how they use manipulation and fear to scam people online, appearing receptive of RN's reassurance that most everyone online encounters them.\"    Upon interview, the patient was lying in bed. Appeared to be less anxious, and reported feeling \"tired.\" Attributes this to not sleeping well the night prior. He also reports dry mouth. Stated that he suspected it was related to Lithium. I explained that it is more likely a side effect from Haldol and/or Cogentin. Discussed option of adding prn Biotene and he was receptive. He also reported loose stools over the weekend, which have since resolved. He did decline to take Lithium on Saturday evening due to concerns that it was causing diarrhea. He denied nausea, vomiting, tremor. He is comfortable with plan to repeat Lithium level again on Wednesday as it was inaccurate (PharmD noted that it was taken after " "he was given AM dose of Lithium and pt missed dose). He said that he is less concerned about \"hackers.\" He denies experiencing suicidal thinking. Feels that his mood is slightly better. He is amenable with plan to continue current medication regimen for now.     Suicidal ideation: denies current SI. Denies that he had a plan and denies current intent or plan. Stated that if he feels unsafe in the hospital, he will notify staff. Reports severe depressive symptoms since 2015.     Homicidal ideation: denies current or recent homicidal ideation or behaviors. \"Never\"    Psychotic symptoms: Patient denies AH and VH.     Medication side effects reported: No significant side effects other than those noted above.    Acute medical concerns: as above    Other issues reported by patient: Patient had no further questions or concerns.      I spoke with patient's mother, Carito, over the phone for > 20 minutes. She said that patient was \"a star in school, went to [employer], and then about a year later, his illness surfaced.\" His mother said that she has been very involved in his care throughout the past 30 years. She mentioned that her father  by suicide when she was pregnant with patient and is questioning whether some of her trauma during pregnancy could have impacted his development. She shared her frustration with the mental health system, noting that \"The mental health system sucks because of politics and our political system.\" However, she spoke very highly of the staff here in the hospital and is grateful for the care Tim is receiving.          Medications:       haloperidol  7.5 mg Oral At Bedtime    And     benztropine  1 mg Oral At Bedtime     benztropine  1 mg Oral Daily     diltiazem ER COATED BEADS  120 mg Oral Daily     fluticasone-vilanterol  1 puff Inhalation Daily     haloperidol  5 mg Oral Daily     isosorbide mononitrate  30 mg Oral Daily     lamoTRIgine  300 mg Oral Daily     lithium  300 mg Oral BID " "w/meals     loratadine  10 mg Oral Daily     metFORMIN  1,500 mg Oral Daily with breakfast     mirtazapine  15 mg Oral At Bedtime     propranolol  20 mg Oral TID     ziprasidone  40 mg Oral BID w/meals          Allergies:     Allergies   Allergen Reactions     No Known Drug Allergies           Labs:     Recent Results (from the past 24 hour(s))   Creatinine    Collection Time: 01/30/23  8:54 AM   Result Value Ref Range    Creatinine 1.11 0.66 - 1.25 mg/dL    GFR Estimate 82 >60 mL/min/1.73m2   Lithium level    Collection Time: 01/30/23  8:54 AM   Result Value Ref Range    Lithium 0.4   mmol/L   Extra Green Top (Lithium Heparin) Tube    Collection Time: 01/30/23  8:54 AM   Result Value Ref Range    Hold Specimen JIC           Psychiatric Examination:     /74   Pulse 64   Temp 98.1  F (36.7  C) (Temporal)   Resp 16   Ht 1.88 m (6' 2\")   Wt 113.4 kg (250 lb)   SpO2 96%   BMI 32.10 kg/m    Weight is 250 lbs 0 oz  Body mass index is 32.1 kg/m .    Weight over time:  Vitals:    01/26/23 0357   Weight: 113.4 kg (250 lb)       Orthostatic Vitals     None            Cardiometabolic risk assessment. 01/27/23      Reviewed patient profile for cardiometabolic risk factors    Date taken /Value  REFERENCE RANGE   Abdominal Obesity  (Waist Circumference)   See nursing flowsheet Women ?35 in (88 cm)   Men ?40 in (102 cm)      Triglycerides  Triglycerides   Date Value Ref Range Status   02/21/2005 130 0 - 150 mg/dL Final       ?150 mg/dL (1.7 mmol/L) or current treatment for elevated triglycerides   HDL cholesterol  HDL Cholesterol   Date Value Ref Range Status   02/21/2005 42 >40 mg/dL Final   ]   Women <50 mg/dL (1.3 mmol/L) in women or current treatment for low HDL cholesterol  Men <40 mg/dL (1 mmol/L) in men or current treatment for low HDL cholesterol     Fasting plasma glucose (FPG) Lab Results   Component Value Date    GLC 94 01/24/2023    GLC 93 04/01/2016      FPG ?100 mg/dL (5.6 mmol/L) or treatment for " "elevated blood glucose   Blood pressure  BP Readings from Last 3 Encounters:   01/30/23 108/74   01/26/23 137/85   10/07/22 122/86    Blood pressure ?130/85 mmHg or treatment for elevated blood pressure   Family History  See family history     Appearance: awake, cooperative, lying in bed  Attitude: cooperative and calm  Eye Contact: good  Mood:  \"okay\"  Affect: mood congruent and anxious, heightened intensity, slightly reactive  Speech:  clear, coherent and flattened prosody  Language: fluent in English  Psychomotor Behavior:  No evidence of tardive dyskinesia, dystonia, or tics  Gait/Station: normal  Thought Process:  linear, illogical, goal oriented  Associations:  no loose associations  Thought Content:  Denying SI/HI/AVH; evidence of severe paranoia  Insight:  fair  Judgement: fair  Oriented to:  time, person, and place  Attention Span and Concentration: fair  Recent and Remote Memory:  intact  Fund of Knowledge: appropriate     Clinical Global Impressions  First:7     Most recent:5              Precautions:     Behavioral Orders   Procedures     Code 1 - Restrict to Unit     Routine Programming     As clinically indicated     Status 15     Every 15 minutes.     Suicide precautions     Patients on Suicide Precautions should have a Combination Diet ordered that includes a Diet selection(s) AND a Behavioral Tray selection for Safe Tray - with utensils, or Safe Tray - NO utensils            Diagnoses:     F25.1 Schizoaffective disorder, depressed type  F41.1 Generalized anxiety disorder  F15.90 Stimulant use disorder, severe, dependence - by history  Prolonged QTc         Assessment & Plan:     Assessment and hospital summary:  This patient is a 47 year old  male with history of schizoaffective disorder, bipolar disorder and stimulant use disorder who presented to ED with paranoid ideation and delusions in context of medication non-adherence and increased psychosocial stressors. Patient was initially " admitted to the [facility] unit, then transferred to station 12 due to paranoid delusion and severe anxiety posing a safety risk. Symptoms and presentation at this time is most consistent with Schizoaffective disorder. I have discussed the risks, benefits, and alternatives of psychiatric hospitalization and medication treatment, including both Haldol and clozapine. Patient is amenable to a trial of haloperidol. He wishes to avoid antipsychotics with a high likelihood of weight gain. Patient will likely benefit from close psychiatric follow up upon discharge.      Inpatient psychiatric hospitalization is warranted at this time for safety, stabilization, and possible adjustment in medications.    Target psychiatric symptoms and interventions:  Will taper and discontinue Geodon due to suspected EPSE and prolonged QTc  Continue Haldol 5 mg daily and 7.5 mg at bedtime. Reduce AM Cogentin to 0.5 mg daily as it is likely contributing to dry mouth and daytime sedation.   Continue hydroxyzine 25 mg q4h prn for acute anxiety  Continue lamotrigine 300 mg qday  Continue lithium 300 mg BID. Initiated in [facility] unit. [place] level reviewed. Will obtain Li level on Wednesday.   Continue mirtazapine 15 mg at bedtime  Continue propranolol 20 mg tid     Risks, benefits, and alternatives discussed at length with patient.     Acute Medical Problems and Treatments:  Prolonged QTc: QTc 472 on 1/24/23 in context of taking Geodon total of 160 mg daily. Likely 2/2 Geodon. Will repeat EKG prior to discharge.     Dry mouth: Reduce Cogentin and add Biotene prn    Monitor closely for evidence of EPSE. Cogentin available.     Behavioral/Psychological/Social:  - Encourage unit programming    Safety:  - Continue precautions as noted above  - Status 15 minute checks    Legal Status: voluntary    Disposition Plan   Reason for ongoing admission: is unable to care for self due to severe psychosis or song  Discharge location: group home  Discharge  Medications: not ordered  Follow-up Appointments: not scheduled    Entered by: Gala Enamorado MD on 1/30/2023 at 4:32 PM

## 2023-01-30 NOTE — PLAN OF CARE
Problem: Adult Behavioral Health Plan of Care  Goal: Plan of Care Review  Outcome: Progressing  Flowsheets  Taken 1/29/2023 2119  Plan of Care Reviewed With: patient  Overall Patient Progress: improving  Patient Agreement with Plan of Care: agrees  Taken 1/29/2023 2000  Patient Agreement with Plan of Care: agrees     Problem: Adult Behavioral Health Plan of Care  Goal: Develops/Participates in Therapeutic Widener to Support Successful Transition  Intervention: Foster Therapeutic Widener  Recent Flowsheet Documentation  Taken 1/29/2023 2000 by Tigre Junior RN  Trust Relationship/Rapport:   care explained   choices provided   emotional support provided   empathic listening provided   questions answered   questions encouraged   reassurance provided   thoughts/feelings acknowledged   Goal Outcome Evaluation:    Plan of Care Reviewed With: patient Plan of Care Reviewed With: patient    Overall Patient Progress: improvingOverall Patient Progress: improving     Patient isolative and withdrawn to room all evening watching television, only in the milieu during requests and to retreive meals. Patient upon approach flat in affect, calm and cooperative with verbal assessment. Patient reporting overall having a good day, identifying that he is feeling more comfortable with very low anxiety and not having any suicidal thoughts. Patient demonstrating less paranoia and improved insight into fears of hackers, able to talk with RN writer about how he is starting to understand that his fears were somewhat exaggerated. Patient engaged in conversation with writer about the current problems with electronic Phishing scams and how they use manipulation and fear to scam people online, appearing receptive of RN's reassurance that most everyone online encounters them    Patient diet appeared good eating 100% of dinner and snacks. Patient still demonstrating understanding of increased fluid intake while taking Lithium. Patient accepting  of HS medications, reporting dry mouth which is improved with increased fluid intake. Patient reporting no GI discomfort or loose stools today. Patient prompted for completion of ADL's and accepting of supplies to shower but identified plan to complete tomorrow.

## 2023-01-30 NOTE — PLAN OF CARE
Goal Outcome Evaluation:  Problem: Suicidal Behavior  Goal: Suicidal Behavior is Absent or Managed  Outcome: Progressing       Plan of Care Reviewed With: patient    Pt mostly isolative in the room only came out to  his tray. Pt refused attending groups despite encouragement from writer. Pt expressed mild anxiety and stated it manageable. Pt denies all other mental health psych symptoms and contracted for safety. Pt cooperated with lab draw this morning and medications. Presented with flat affect but fairly brighter mood during interaction. Pt speech clear, coherent, and good eye contact. No paranoid episode observed or reported. Pt expressed dry month and fluid encouraged and pt concern updated during team meeting. Pt also expressed loose BM and imodium included in his morning medications. At the end of the shift pt stated BM frequency had reduced to two today. Good appetite and voiding freely per pt.

## 2023-01-30 NOTE — PLAN OF CARE
Assessment/Intervention/Current Symtoms and Care Coordination  - Refer to psychosocial completed on 1/26/2023 for assessment/social functioning  - Chart review  - Team Meeting      He will return to group home upon stabilization.  Continues on a voluntary basis.      Discharge Plan or Goal  Pending stabilization & development of a safe discharge plan.  Considerations include: Return to group home     Barriers to Discharge  Patient requires further psychiatric stabilization due to current symptomology     Referral Status  Psychiatry and [facility] Case Management     Legal Status  Patient is voluntary     Contacts:  Outpatient Psychiatrist: Dr. Greg Mensah, [facility] ([phone])  Outpatient Therapist: Matthew Emery, [facility], [phone]  Primary Care Provider: Dr. Lonny Wright, [facility] ([phone])  Mental Health : Chaitanya Cuevas at [facility] ([phone])  CADI : Nalini at [place] ([phone])  [facility] : Dasia ([phone])  [facility] Group Home Nurse: Anthony ([phone])

## 2023-01-31 PROCEDURE — 250N000013 HC RX MED GY IP 250 OP 250 PS 637: Performed by: PSYCHIATRY & NEUROLOGY

## 2023-01-31 PROCEDURE — H2032 ACTIVITY THERAPY, PER 15 MIN: HCPCS

## 2023-01-31 PROCEDURE — 124N000002 HC R&B MH UMMC

## 2023-01-31 RX ADMIN — LAMOTRIGINE 300 MG: 150 TABLET ORAL at 08:03

## 2023-01-31 RX ADMIN — HYDROXYZINE HYDROCHLORIDE 25 MG: 25 TABLET, FILM COATED ORAL at 08:06

## 2023-01-31 RX ADMIN — LITHIUM CARBONATE 300 MG: 150 CAPSULE, GELATIN COATED ORAL at 19:12

## 2023-01-31 RX ADMIN — METFORMIN HYDROCHLORIDE 1500 MG: 750 TABLET, EXTENDED RELEASE ORAL at 08:03

## 2023-01-31 RX ADMIN — ZIPRASIDONE HYDROCHLORIDE 40 MG: 40 CAPSULE ORAL at 19:12

## 2023-01-31 RX ADMIN — Medication 7.5 MG: at 20:15

## 2023-01-31 RX ADMIN — DILTIAZEM HYDROCHLORIDE 120 MG: 120 CAPSULE, COATED, EXTENDED RELEASE ORAL at 08:03

## 2023-01-31 RX ADMIN — FLUTICASONE FUROATE AND VILANTEROL TRIFENATATE 1 PUFF: 100; 25 POWDER RESPIRATORY (INHALATION) at 08:06

## 2023-01-31 RX ADMIN — MIRTAZAPINE 15 MG: 15 TABLET, FILM COATED ORAL at 20:15

## 2023-01-31 RX ADMIN — PROPRANOLOL HYDROCHLORIDE 20 MG: 10 TABLET ORAL at 08:04

## 2023-01-31 RX ADMIN — PROPRANOLOL HYDROCHLORIDE 20 MG: 10 TABLET ORAL at 20:15

## 2023-01-31 RX ADMIN — ISOSORBIDE MONONITRATE 30 MG: 30 TABLET, EXTENDED RELEASE ORAL at 08:03

## 2023-01-31 RX ADMIN — LORATADINE 10 MG: 10 TABLET ORAL at 08:03

## 2023-01-31 RX ADMIN — LITHIUM CARBONATE 300 MG: 150 CAPSULE, GELATIN COATED ORAL at 08:04

## 2023-01-31 RX ADMIN — ZIPRASIDONE HYDROCHLORIDE 40 MG: 40 CAPSULE ORAL at 08:03

## 2023-01-31 RX ADMIN — BENZTROPINE MESYLATE 0.5 MG: 0.5 TABLET ORAL at 08:04

## 2023-01-31 RX ADMIN — HALOPERIDOL 5 MG: 5 TABLET ORAL at 08:03

## 2023-01-31 RX ADMIN — BENZTROPINE MESYLATE 1 MG: 1 TABLET ORAL at 20:15

## 2023-01-31 RX ADMIN — PROPRANOLOL HYDROCHLORIDE 20 MG: 10 TABLET ORAL at 13:34

## 2023-01-31 ASSESSMENT — ACTIVITIES OF DAILY LIVING (ADL)
DRESS: INDEPENDENT
ADLS_ACUITY_SCORE: 30
HYGIENE/GROOMING: INDEPENDENT
ADLS_ACUITY_SCORE: 30
ORAL_HYGIENE: INDEPENDENT
ADLS_ACUITY_SCORE: 30
HYGIENE/GROOMING: INDEPENDENT
ORAL_HYGIENE: INDEPENDENT
LAUNDRY: WITH SUPERVISION
LAUNDRY: WITH SUPERVISION
ADLS_ACUITY_SCORE: 30
ADLS_ACUITY_SCORE: 30
DRESS: INDEPENDENT

## 2023-01-31 NOTE — PLAN OF CARE
Assessment/Intervention/Current Symtoms and Care Coordination  - Refer to psychosocial completed on 1/26/2023 for assessment/social functioning  - Chart review  - Team Meeting      He will return to group home upon stabilization.  Continues on a voluntary basis.   He needs time to stabilize further.     Discharge Plan or Goal  Pending stabilization & development of a safe discharge plan.  Considerations include: Return to group home     Barriers to Discharge  Patient requires further psychiatric stabilization due to current symptomology     Referral Status  Psychiatry and [facility] Case Management     Legal Status  Patient is voluntary     Contacts:  Outpatient Psychiatrist: Dr. Greg Mensah, [facility] ([phone])  Outpatient Therapist: Matthew Emery, [facility], [phone]  Primary Care Provider: Dr. Lonny Wright, [facility] ([phone])  Mental Health : Chaitanya Cuevas at [facility] ([phone])  CADI : Nalini at [place] ([phone])  [facility] : Dasia ([phone])  [facility] Group Home Nurse: Anthony ([phone])

## 2023-01-31 NOTE — PLAN OF CARE
Problem: Sleep Disturbance  Goal: Adequate Sleep/Rest  Outcome: Progressing   Goal Outcome Evaluation:    Patient slept 6.75 during the shift. Had no unlabored respiration observed. Safety checked every 15 minute was done. Patient is on suicide precaution. Will continue to monitor.

## 2023-01-31 NOTE — PLAN OF CARE
Problem: Adult Behavioral Health Plan of Care  Goal: Plan of Care Review  Outcome: Progressing     Problem: Sleep Disturbance  Goal: Adequate Sleep/Rest  Outcome: Progressing   Goal Outcome Evaluation  Patient does not report any hallucinations, delusions, endorses a little depression and anxiety, accepted hydroxyzine 25 mg prn. Patient denies SI/SIB. Patient was calm cooperative and appropriate, affect brightening up with interaction. Patient's vss, Patient's speech is quiet, normal volume. Patient isolated and withdrawn, spent alot of time in bed, he did not attend the group, BP was 108/76 and 102/70. Overall patient states that he is doing good. Patient was medication compliant, no side effects reported. Patient has no other concerns, no pain/discomfort, will continue to monitor.

## 2023-01-31 NOTE — PLAN OF CARE
"RN Assessment   Isolative and withdrawn most of the shift. Mood reported to be \"much better\" when compared to yesterday. Denies anxiety. Affect blunted, restricted, and incongruent. Denies AH/VH/TH, paranoid or delusional thinking. Sated, \"not anymore and that feels good.\" Poor ADL's, presents unkempt with unclean hair and scrubs.   Accepted all medications. Propranolol was held [ BP 92/67]    Hypotensive with repeat assessments, but asymptomatic. Fluids encouraged.   BP 92/67   Pulse 62   Temp 98.1  F (36.7  C) (Temporal)   Resp 16   Ht 1.88 m (6' 2\")   Wt 113.4 kg (250 lb)   SpO2 97%   BMI 32.10 kg/m      "

## 2023-02-01 LAB — LITHIUM SERPL-SCNC: 0.3 MMOL/L

## 2023-02-01 PROCEDURE — 250N000013 HC RX MED GY IP 250 OP 250 PS 637: Performed by: PSYCHIATRY & NEUROLOGY

## 2023-02-01 PROCEDURE — 80178 ASSAY OF LITHIUM: CPT | Performed by: PSYCHIATRY & NEUROLOGY

## 2023-02-01 PROCEDURE — 36415 COLL VENOUS BLD VENIPUNCTURE: CPT | Performed by: PSYCHIATRY & NEUROLOGY

## 2023-02-01 PROCEDURE — 124N000002 HC R&B MH UMMC

## 2023-02-01 PROCEDURE — 99233 SBSQ HOSP IP/OBS HIGH 50: CPT | Performed by: PSYCHIATRY & NEUROLOGY

## 2023-02-01 RX ORDER — BENZTROPINE MESYLATE 0.5 MG/1
0.5 TABLET ORAL AT BEDTIME
Status: DISCONTINUED | OUTPATIENT
Start: 2023-02-01 | End: 2023-02-03

## 2023-02-01 RX ORDER — LITHIUM CARBONATE 300 MG/1
300 TABLET, FILM COATED, EXTENDED RELEASE ORAL DAILY
Status: DISCONTINUED | OUTPATIENT
Start: 2023-02-02 | End: 2023-02-08

## 2023-02-01 RX ORDER — ZIPRASIDONE HYDROCHLORIDE 20 MG/1
20 CAPSULE ORAL 2 TIMES DAILY WITH MEALS
Status: DISCONTINUED | OUTPATIENT
Start: 2023-02-02 | End: 2023-02-03

## 2023-02-01 RX ORDER — LITHIUM CARBONATE 300 MG/1
600 TABLET, FILM COATED, EXTENDED RELEASE ORAL AT BEDTIME
Status: DISCONTINUED | OUTPATIENT
Start: 2023-02-01 | End: 2023-02-08

## 2023-02-01 RX ADMIN — METFORMIN HYDROCHLORIDE 1500 MG: 750 TABLET, EXTENDED RELEASE ORAL at 08:05

## 2023-02-01 RX ADMIN — LORATADINE 10 MG: 10 TABLET ORAL at 08:06

## 2023-02-01 RX ADMIN — PROPRANOLOL HYDROCHLORIDE 20 MG: 10 TABLET ORAL at 21:12

## 2023-02-01 RX ADMIN — ZIPRASIDONE HYDROCHLORIDE 40 MG: 40 CAPSULE ORAL at 17:44

## 2023-02-01 RX ADMIN — LITHIUM CARBONATE 300 MG: 150 CAPSULE, GELATIN COATED ORAL at 11:43

## 2023-02-01 RX ADMIN — DILTIAZEM HYDROCHLORIDE 120 MG: 120 CAPSULE, COATED, EXTENDED RELEASE ORAL at 08:05

## 2023-02-01 RX ADMIN — LAMOTRIGINE 300 MG: 150 TABLET ORAL at 08:05

## 2023-02-01 RX ADMIN — Medication 7.5 MG: at 21:12

## 2023-02-01 RX ADMIN — PROPRANOLOL HYDROCHLORIDE 20 MG: 10 TABLET ORAL at 14:17

## 2023-02-01 RX ADMIN — HALOPERIDOL 5 MG: 5 TABLET ORAL at 08:05

## 2023-02-01 RX ADMIN — FLUTICASONE FUROATE AND VILANTEROL TRIFENATATE 1 PUFF: 100; 25 POWDER RESPIRATORY (INHALATION) at 08:06

## 2023-02-01 RX ADMIN — MIRTAZAPINE 15 MG: 15 TABLET, FILM COATED ORAL at 21:13

## 2023-02-01 RX ADMIN — BENZTROPINE MESYLATE 0.5 MG: 0.5 TABLET ORAL at 21:12

## 2023-02-01 RX ADMIN — ISOSORBIDE MONONITRATE 30 MG: 30 TABLET, EXTENDED RELEASE ORAL at 08:05

## 2023-02-01 RX ADMIN — LITHIUM CARBONATE 600 MG: 300 TABLET, EXTENDED RELEASE ORAL at 21:12

## 2023-02-01 RX ADMIN — ZIPRASIDONE HYDROCHLORIDE 40 MG: 40 CAPSULE ORAL at 08:05

## 2023-02-01 RX ADMIN — BENZTROPINE MESYLATE 0.5 MG: 0.5 TABLET ORAL at 08:05

## 2023-02-01 RX ADMIN — PROPRANOLOL HYDROCHLORIDE 20 MG: 10 TABLET ORAL at 08:05

## 2023-02-01 ASSESSMENT — ACTIVITIES OF DAILY LIVING (ADL)
ADLS_ACUITY_SCORE: 30
HYGIENE/GROOMING: INDEPENDENT
ADLS_ACUITY_SCORE: 30
DRESS: INDEPENDENT
ADLS_ACUITY_SCORE: 30
DRESS: INDEPENDENT
HYGIENE/GROOMING: INDEPENDENT
ADLS_ACUITY_SCORE: 30
ADLS_ACUITY_SCORE: 30
ORAL_HYGIENE: INDEPENDENT
ADLS_ACUITY_SCORE: 30
ADLS_ACUITY_SCORE: 30
LAUNDRY: UNABLE TO COMPLETE
ADLS_ACUITY_SCORE: 30
LAUNDRY: UNABLE TO COMPLETE
ADLS_ACUITY_SCORE: 30
ADLS_ACUITY_SCORE: 30
ORAL_HYGIENE: INDEPENDENT

## 2023-02-01 NOTE — PLAN OF CARE
Assessment/Intervention/Current Symtoms and Care Coordination  - Refer to psychosocial completed on 1/26/2023 for assessment/social functioning  - Chart review  - Team Meeting      He will return to group home upon stabilization.  Continues on a voluntary basis. Possible discharge next week.  AVS started.      Discharge Plan or Goal  Pending stabilization & development of a safe discharge plan.  Considerations include: Return to group home  AVS started with outpatient follow up appointments scheduled     Barriers to Discharge  Patient requires further psychiatric stabilization due to current symptomology     Referral Status  No referrals made yet     Legal Status  Patient is voluntary     Contacts:  Outpatient Psychiatrist: Dr. Greg Mensah, [facility] ([phone])  Outpatient Therapist: Matthew Emery, [facility], [phone]  Primary Care Provider: Dr. Lonny Wright, [facility] ([phone])  Mental Health : Chaitanya Cuevas at [facility] ([phone])  CADI : Nalini [facility] [place] ([phone])  [facility] : Dasia ([phone])  [facility] Group Home Nurse: Anthony ([phone])

## 2023-02-01 NOTE — PLAN OF CARE
Pt awake at start of shift.     Breathing quiet and unlabored when sleeping.     Pt had no c/o pain or discomfort during the HS.     Appears to have slept 6 hours.     Goal Outcome Evaluation:    Problem: Sleep Disturbance  Goal: Adequate Sleep/Rest  Outcome: Progressing

## 2023-02-01 NOTE — PLAN OF CARE
"Nursing Assessment    Recent Vitals: B/P: 113/80, T: 98.7, P: 72, R: 16     Sleep:  Hours of sleep at night: 6    General Shift Summary  Patient has primarily isolated to his room coming out for meals. He is calm and cooperative. Patient presents with a blunted affect but brightens upon approach. He voiced concerns with his Lithium levels being too high, levels were 0.3 which are not therapeutic. Patient was educated that the provider is increasing his dosage and that his dry mouth could be related to his Cogentin per provider. Patient was understanding of this and accepting of his Lithium increase. He denied depression, SI/HI/AVH/SIB and voiced having mild anxiety. Patient is eating well. Hygiene is good. Incite and judgment seem good. Concentration is good. Patient was encouraged to attend groups however stated that \"I probably won't\".    Plan is to monitor patient's status with medications adjustments. Potential discharge next week.    Kizzy Morris RN MSN  "

## 2023-02-01 NOTE — PLAN OF CARE
02/01/23 1621   Group Therapy Session   Group Attendance other (see comments)   Patient Participation Detail Knocked on pts room door, invited him to group, though stated he was tired today - perhaps tomorrow.

## 2023-02-01 NOTE — PROGRESS NOTES
"[facility], [place]   Psychiatric Progress Note  Hospital Day: 6        Interim History:   The patient's care was discussed with the treatment team during the daily team meeting and/or staff's chart notes were reviewed.  Staff report patient has been isolative and withdrawn. Mood reported to be \"much better\" when compared to 1/29. Denies anxiety. Affect blunted, restricted, and incongruent. Denies AH/VH/TH, paranoid or delusional thinking. Sated, \"not anymore and that feels good.\" Poor ADL's, presents unkempt with unclean hair and scrubs. Sleeping relatively well. Affect is brightening. He is now attending and participating in group activities with encouragement.     Upon interview, Tim was sitting in the lounge. His eye contact and affect noticeably improved. He said that he feels better. He reports feeling \"actually happy.\" He did not express any concerns about \"hackers\" today. He shared that he and his father enjoy watching football together and his goal is to be discharged prior to the Super Bowl so that he can spend that day with his father. However, he added \"But if I am not ready, I am not ready.\" He continues to experience dry mouth, which he attributes to Lithium. Discussed that the most likely culprit is Cogentin. We discussed plan to reduce the dose of Cogentin and he will also utilize prn Biotene. He was encouraged to drink adequate fluids. He has no other side effects. He is amenable to a Lithium dose increase after lab results reviewed. He asked appropriate questions about Lithium.     Suicidal ideation: denies current SI. Denies that he had a plan and denies current intent or plan. Stated that if he feels unsafe in the hospital, he will notify staff. Reports severe depressive symptoms since 2015.     Homicidal ideation: denies current or recent homicidal ideation or behaviors. \"Never\"    Psychotic symptoms: Patient denies AH and VH. He denies paranoia. " "    Medication side effects reported: No significant side effects other than those noted above.    Acute medical concerns: as above    Other issues reported by patient: Patient had no further questions or concerns.           Medications:       benztropine  0.5 mg Oral Daily     haloperidol  7.5 mg Oral At Bedtime    And     benztropine  1 mg Oral At Bedtime     diltiazem ER COATED BEADS  120 mg Oral Daily     fluticasone-vilanterol  1 puff Inhalation Daily     haloperidol  5 mg Oral Daily     isosorbide mononitrate  30 mg Oral Daily     lamoTRIgine  300 mg Oral Daily     lithium  300 mg Oral BID w/meals     loratadine  10 mg Oral Daily     metFORMIN  1,500 mg Oral Daily with breakfast     mirtazapine  15 mg Oral At Bedtime     propranolol  20 mg Oral TID     ziprasidone  40 mg Oral BID w/meals          Allergies:     Allergies   Allergen Reactions     No Known Drug Allergies           Labs:     No results found for this or any previous visit (from the past 24 hour(s)).       Psychiatric Examination:     /80 (BP Location: Left arm, Patient Position: Sitting, Cuff Size: Adult Large)   Pulse 72   Temp 98.7  F (37.1  C) (Temporal)   Resp 16   Ht 1.88 m (6' 2\")   Wt 113.4 kg (250 lb)   SpO2 94%   BMI 32.10 kg/m    Weight is 250 lbs 0 oz  Body mass index is 32.1 kg/m .    Weight over time:  Vitals:    01/26/23 0357 01/31/23 0804   Weight: 113.4 kg (250 lb) 113.4 kg (250 lb)       Orthostatic Vitals     None            Cardiometabolic risk assessment. 01/27/23      Reviewed patient profile for cardiometabolic risk factors    Date taken /Value  REFERENCE RANGE   Abdominal Obesity  (Waist Circumference)   See nursing flowsheet Women ?35 in (88 cm)   Men ?40 in (102 cm)      Triglycerides  Triglycerides   Date Value Ref Range Status   02/21/2005 130 0 - 150 mg/dL Final       ?150 mg/dL (1.7 mmol/L) or current treatment for elevated triglycerides   HDL cholesterol  HDL Cholesterol   Date Value Ref Range Status " "  02/21/2005 42 >40 mg/dL Final   ]   Women <50 mg/dL (1.3 mmol/L) in women or current treatment for low HDL cholesterol  Men <40 mg/dL (1 mmol/L) in men or current treatment for low HDL cholesterol     Fasting plasma glucose (FPG) Lab Results   Component Value Date    GLC 94 01/24/2023    GLC 93 04/01/2016      FPG ?100 mg/dL (5.6 mmol/L) or treatment for elevated blood glucose   Blood pressure  BP Readings from Last 3 Encounters:   02/01/23 113/80   01/26/23 137/85   10/07/22 122/86    Blood pressure ?130/85 mmHg or treatment for elevated blood pressure   Family History  See family history     Appearance: awake, cooperative, lying in bed  Attitude: cooperative and calm  Eye Contact: good, improved  Mood:  \"actually happy\"  Affect: mood congruent and calmer  Speech:  clear, coherent and flattened prosody  Language: fluent in English  Psychomotor Behavior:  No evidence of tardive dyskinesia, dystonia, or tics  Gait/Station: normal  Thought Process:  linear, logical, goal oriented  Associations:  no loose associations  Thought Content:  Denying SI/HI/AVH; no overt symptoms of psychosis  Insight:  fair, improved  Judgement: fair, improved  Oriented to:  time, person, and place  Attention Span and Concentration: fair  Recent and Remote Memory:  intact  Fund of Knowledge: appropriate     Clinical Global Impressions  First:7     Most recent:4              Precautions:     Behavioral Orders   Procedures     Code 1 - Restrict to Unit     Routine Programming     As clinically indicated     Status 15     Every 15 minutes.     Suicide precautions     Patients on Suicide Precautions should have a Combination Diet ordered that includes a Diet selection(s) AND a Behavioral Tray selection for Safe Tray - with utensils, or Safe Tray - NO utensils            Diagnoses:     F25.1 Schizoaffective disorder, depressed type  F41.1 Generalized anxiety disorder  F15.90 Stimulant use disorder, severe, dependence - by history  Prolonged " QTc         Assessment & Plan:     Assessment and hospital summary:  This patient is a 47 year old  male with history of schizoaffective disorder, bipolar disorder and stimulant use disorder who presented to ED with paranoid ideation and delusions in context of medication non-adherence and increased psychosocial stressors. Patient was initially admitted to the [facility] unit, then transferred to station 12 due to paranoid delusion and severe anxiety posing a safety risk. Symptoms and presentation at this time is most consistent with Schizoaffective disorder. I have discussed the risks, benefits, and alternatives of psychiatric hospitalization and medication treatment, including both Haldol and clozapine. Patient is amenable to a trial of haloperidol. He wishes to avoid antipsychotics with a high likelihood of weight gain. Patient will likely benefit from close psychiatric follow up upon discharge.      Inpatient psychiatric hospitalization is warranted at this time for safety, stabilization, and possible adjustment in medications.    Target psychiatric symptoms and interventions:  Will taper and discontinue Geodon due to suspected EPSE and prolonged QTc  Continue Haldol 5 mg daily and 7.5 mg at bedtime.  Continue Cogentin 0.5 mg daily and reduce to 0.5 mg QHS  Continue hydroxyzine 25 mg q4h prn for acute anxiety  Continue lamotrigine 300 mg qday  Continue lithium 300 mg BID. Initiated in [facility] unit. [place] level reviewed. Will Obtained Li level again today as Li level on 1/30 was not a trough. Update that Li level was 0.3. Pt amenable to dose increase. Will switch to CR and increase to 300 mg daily/600 mg at bedtime.   Continue mirtazapine 15 mg at bedtime  Continue propranolol 20 mg tid     Risks, benefits, and alternatives discussed at length with patient.     Acute Medical Problems and Treatments:  Prolonged QTc: QTc 472 on 1/24/23 in context of taking Geodon total of 160 mg daily. Likely 2/2 Geodon. Will  repeat EKG prior to discharge.     Dry mouth: Reduced Cogentin and added Biotene prn on 1/30. Encouraged fluids.     Monitor closely for evidence of EPSE. Cogentin available.     Behavioral/Psychological/Social:  - Encourage unit programming    Safety:  - Continue precautions as noted above  - Status 15 minute checks    Legal Status: voluntary    Disposition Plan   Reason for ongoing admission: is unable to care for self due to severe psychosis or song  Discharge location: group home  Discharge Medications: not ordered  Follow-up Appointments: not scheduled    Entered by: Gala Enamorado MD on 2/1/2023 at 8:43 AM

## 2023-02-01 NOTE — PLAN OF CARE
Problem: Adult Behavioral Health Plan of Care  Goal: Adheres to Safety Considerations for Self and Others  Outcome: Progressing  Intervention: Develop and Maintain Individualized Safety Plan  Recent Flowsheet Documentation  Taken 1/31/2023 1706 by Magdiel North, RN  Safety Measures:    suicide check-in completed    safety rounds completed   Goal Outcome Evaluation:    Plan of Care Reviewed With: patient        Pt mostly isolative in his room however attended and participated in group activities today after encouragement from staff. Pt spent some time in the lounge area watching basket ball with peers which is a great improvement from previous weeks.  Pt endorsed mild anxiety and depression and stated it's manageable by watching television. Pt denies all other mental health symptoms and contracted for safety. Pt visited by both parents today and visit went well without any incident. Pt speech clear and coherent and maintained good eye contact. Pt complied with medications and no side effect observed or reported. Pt ate 100% dinner, voiding freely and denies having loose BM today.

## 2023-02-02 PROCEDURE — 124N000002 HC R&B MH UMMC

## 2023-02-02 PROCEDURE — 250N000013 HC RX MED GY IP 250 OP 250 PS 637: Performed by: PSYCHIATRY & NEUROLOGY

## 2023-02-02 PROCEDURE — 99232 SBSQ HOSP IP/OBS MODERATE 35: CPT | Mod: GC | Performed by: PSYCHIATRY & NEUROLOGY

## 2023-02-02 RX ADMIN — METFORMIN HYDROCHLORIDE 1500 MG: 750 TABLET, EXTENDED RELEASE ORAL at 08:22

## 2023-02-02 RX ADMIN — PROPRANOLOL HYDROCHLORIDE 20 MG: 10 TABLET ORAL at 13:55

## 2023-02-02 RX ADMIN — ISOSORBIDE MONONITRATE 30 MG: 30 TABLET, EXTENDED RELEASE ORAL at 08:22

## 2023-02-02 RX ADMIN — ZIPRASIDONE HCL 20 MG: 20 CAPSULE ORAL at 17:59

## 2023-02-02 RX ADMIN — BENZTROPINE MESYLATE 0.5 MG: 0.5 TABLET ORAL at 08:22

## 2023-02-02 RX ADMIN — LAMOTRIGINE 300 MG: 150 TABLET ORAL at 08:22

## 2023-02-02 RX ADMIN — LORATADINE 10 MG: 10 TABLET ORAL at 08:22

## 2023-02-02 RX ADMIN — LITHIUM CARBONATE 600 MG: 300 TABLET, EXTENDED RELEASE ORAL at 20:17

## 2023-02-02 RX ADMIN — Medication 7.5 MG: at 20:14

## 2023-02-02 RX ADMIN — BENZTROPINE MESYLATE 0.5 MG: 0.5 TABLET ORAL at 20:16

## 2023-02-02 RX ADMIN — Medication 2 SPRAY: at 08:28

## 2023-02-02 RX ADMIN — PROPRANOLOL HYDROCHLORIDE 20 MG: 10 TABLET ORAL at 08:21

## 2023-02-02 RX ADMIN — DILTIAZEM HYDROCHLORIDE 120 MG: 120 CAPSULE, COATED, EXTENDED RELEASE ORAL at 08:22

## 2023-02-02 RX ADMIN — LITHIUM CARBONATE 300 MG: 300 TABLET, EXTENDED RELEASE ORAL at 08:22

## 2023-02-02 RX ADMIN — MIRTAZAPINE 15 MG: 15 TABLET, FILM COATED ORAL at 20:17

## 2023-02-02 RX ADMIN — PROPRANOLOL HYDROCHLORIDE 20 MG: 10 TABLET ORAL at 20:14

## 2023-02-02 RX ADMIN — HALOPERIDOL 5 MG: 5 TABLET ORAL at 08:22

## 2023-02-02 RX ADMIN — FLUTICASONE FUROATE AND VILANTEROL TRIFENATATE 1 PUFF: 100; 25 POWDER RESPIRATORY (INHALATION) at 08:25

## 2023-02-02 RX ADMIN — ZIPRASIDONE HCL 20 MG: 20 CAPSULE ORAL at 08:21

## 2023-02-02 ASSESSMENT — ACTIVITIES OF DAILY LIVING (ADL)
DRESS: INDEPENDENT
ADLS_ACUITY_SCORE: 30
ORAL_HYGIENE: INDEPENDENT
ADLS_ACUITY_SCORE: 30
ORAL_HYGIENE: INDEPENDENT
LAUNDRY: UNABLE TO COMPLETE
DRESS: INDEPENDENT
ADLS_ACUITY_SCORE: 30
HYGIENE/GROOMING: INDEPENDENT
HYGIENE/GROOMING: INDEPENDENT
ADLS_ACUITY_SCORE: 30
ADLS_ACUITY_SCORE: 30

## 2023-02-02 NOTE — PLAN OF CARE
Problem: Depressive Signs/Symptoms  Goal: Optimized Energy Level (Depressive Signs/Symptoms)  Outcome: Progressing   Goal Outcome Evaluation:    Plan of Care Reviewed With: patient      Patient was mostly isolative to his room in a calm mood. He reports anxiety and depression at 5/10, denies SI/SIB and hallucinations. Patient ate dinner out in the lounge area, is medication complaint and denies any pain or discomfort.

## 2023-02-02 NOTE — PROGRESS NOTES
"[facility], [place]   Psychiatric Progress Note  Hospital Day: 7        Interim History:   The patient's care was discussed with the treatment team during the daily team meeting and/or staff's chart notes were reviewed.  Staff report patient appeared calm and cooperative. He is withdrawn socially with a blunted affect but brightens on approach. . Patient ate dinner out in the lounge area, is medication complaint and denies any pain or discomfort. He did not attend groups.     Upon interview: Tim was interviewed in his room. He reports his mood is \"pretty good\" and states his medications are helping. He endorses ongoing dry mouth which Biotene spray helps temporarily. Anthony reports sleep was \"really good\" and denies nightmares or sleep disturbances. We discussed Anthony's dry mouth symptoms and he was encouraged to sip water and utilize Biotene throughout the day. Anthony denies tremors, stiffness, restlessness, posturing or abnormal movements. He was made aware of his pending lithium lab on 2/7/23 and states he desires to return to his group home after discharge. Anthony states his  the \"painful energy\" sensation he felt resolved after starting haloperidol and is amenable to continuing this medication.     Suicidal ideation: denies current SI plan or intent    Homicidal ideation: denies current or recent homicidal ideation or behaviors.     Psychotic symptoms: Patient denies current AH and VH. He denies paranoia.    Medication side effects reported: No significant side effects other than those noted above.    Acute medical concerns: as above    Other issues reported by patient: Patient had no further questions or concerns.           Medications:       haloperidol  7.5 mg Oral At Bedtime    And     benztropine  0.5 mg Oral At Bedtime     benztropine  0.5 mg Oral Daily     diltiazem ER COATED BEADS  120 mg Oral Daily     fluticasone-vilanterol  1 puff Inhalation Daily     haloperidol  5 " "mg Oral Daily     isosorbide mononitrate  30 mg Oral Daily     lamoTRIgine  300 mg Oral Daily     lithium ER  300 mg Oral Daily     lithium ER  600 mg Oral At Bedtime     loratadine  10 mg Oral Daily     metFORMIN  1,500 mg Oral Daily with breakfast     mirtazapine  15 mg Oral At Bedtime     propranolol  20 mg Oral TID     ziprasidone  20 mg Oral BID w/meals          Allergies:     Allergies   Allergen Reactions     No Known Drug Allergies           Labs:     Recent Results (from the past 24 hour(s))   Lithium level    Collection Time: 02/01/23  8:50 AM   Result Value Ref Range    Lithium 0.3   mmol/L          Psychiatric Examination:     /80   Pulse 71   Temp 98.3  F (36.8  C) (Temporal)   Resp 16   Ht 1.88 m (6' 2\")   Wt 113.4 kg (250 lb)   SpO2 97%   BMI 32.10 kg/m    Weight is 250 lbs 0 oz  Body mass index is 32.1 kg/m .    Weight over time:  Vitals:    01/26/23 0357 01/31/23 0804   Weight: 113.4 kg (250 lb) 113.4 kg (250 lb)       Orthostatic Vitals     None        Cardiometabolic risk assessment. 01/27/23      Reviewed patient profile for cardiometabolic risk factors    Date taken /Value  REFERENCE RANGE   Abdominal Obesity  (Waist Circumference)   See nursing flowsheet Women ?35 in (88 cm)   Men ?40 in (102 cm)      Triglycerides  Triglycerides   Date Value Ref Range Status   02/21/2005 130 0 - 150 mg/dL Final       ?150 mg/dL (1.7 mmol/L) or current treatment for elevated triglycerides   HDL cholesterol  HDL Cholesterol   Date Value Ref Range Status   02/21/2005 42 >40 mg/dL Final   ]   Women <50 mg/dL (1.3 mmol/L) in women or current treatment for low HDL cholesterol  Men <40 mg/dL (1 mmol/L) in men or current treatment for low HDL cholesterol     Fasting plasma glucose (FPG) Lab Results   Component Value Date    GLC 94 01/24/2023    GLC 93 04/01/2016      FPG ?100 mg/dL (5.6 mmol/L) or treatment for elevated blood glucose   Blood pressure  BP Readings from Last 3 Encounters:   02/01/23 115/80 " "  01/26/23 137/85   10/07/22 122/86    Blood pressure ?130/85 mmHg or treatment for elevated blood pressure   Family History  See family history     Appearance: awake, cooperative, lying in bed  Attitude: cooperative and calm  Eye Contact: good  Mood:  \"pretty good\"  Affect: mood congruent and appropriate  Speech:  clear, coherent and flattened prosody  Language: fluent in English  Psychomotor Behavior:  No evidence of tardive dyskinesia, dystonia, or tics  Gait/Station: normal  Thought Process:  linear, logical, goal oriented  Associations:  no loose associations  Thought Content:  Denying SI/HI/AVH; no overt symptoms of psychosis  Insight:  fair  Judgement: fair  Oriented to:  person, and place  Attention Span and Concentration: intact  Recent and Remote Memory:  intact  Fund of Knowledge: appropriate     Clinical Global Impressions  First:7     Most recent:4           Precautions:     Behavioral Orders   Procedures     Code 1 - Restrict to Unit     Routine Programming     As clinically indicated     Status 15     Every 15 minutes.     Suicide precautions     Patients on Suicide Precautions should have a Combination Diet ordered that includes a Diet selection(s) AND a Behavioral Tray selection for Safe Tray - with utensils, or Safe Tray - NO utensils            Diagnoses:     F25.1 Schizoaffective disorder, depressed type  F41.1 Generalized anxiety disorder  F15.90 Stimulant use disorder, severe, dependence - by history  Prolonged QTc         Assessment & Plan:     Assessment and hospital summary:  This patient is a 47 year old  male with history of schizoaffective disorder, bipolar disorder and stimulant use disorder who presented to ED with paranoid ideation and delusions in context of medication non-adherence and increased psychosocial stressors. Patient was initially admitted to the [facility] unit, then transferred to station 12 due to paranoid delusion and severe anxiety posing a safety risk. Symptoms " and presentation at this time is most consistent with Schizoaffective disorder. I have discussed the risks, benefits, and alternatives of psychiatric hospitalization and medication treatment, including both Haldol and clozapine. Patient is amenable to a trial of haloperidol. He wishes to avoid antipsychotics with a high likelihood of weight gain. Patient will likely benefit from close psychiatric follow up upon discharge.      Inpatient psychiatric hospitalization is warranted at this time for safety, stabilization, and possible adjustment in medications.    Hospital course:  2/2/23: Reporting ongoing dry mouth.     Target psychiatric symptoms and interventions:  Continue Cogentin 0.5 mg daily and 0.5 mg at bedtime  Continue Haldol 5 mg daily and 7.5 mg at bedtime.  Continue hydroxyzine 25 mg q4h prn for acute anxiety  Continue lamotrigine 300 mg qday  Continue lithium 300 mg qday and 600 mg qhs. Initiated in [facility] unit. Lithium level reviewed. Repeat Li level pending on 2/7/23.    Continue mirtazapine 15 mg at bedtime  Continue propranolol 20 mg tid  Continue ziprasidone 20 mg bid Will taper and discontinue Geodon due to suspected EPSE and prolonged QTc     Risks, benefits, and alternatives discussed at length with patient.     Acute Medical Problems and Treatments:  Prolonged QTc: QTc 472 on 1/24/23 in context of taking Geodon total of 160 mg daily. Likely 2/2 Geodon. Will repeat EKG prior to discharge.     Dry mouth: Reduced Cogentin and added Biotene prn on 1/30. Encouraged fluids.     Monitor closely for evidence of EPSE. Cogentin available.     Behavioral/Psychological/Social:  - Encourage unit programming    Safety:  - Continue precautions as noted above  - Status 15 minute checks    Legal Status: voluntary    Disposition Plan   Reason for ongoing admission: is unable to care for self due to severe psychosis or song  Discharge location: group home  Discharge Medications: not ordered  Follow-up Appointments:  not scheduled    Entered by: Saurabh Watters MD on 2/1/2023 at 8:21 AM

## 2023-02-02 NOTE — PLAN OF CARE
"Nursing Assessment    Recent Vitals: B/P: 122/82, T: 98.6, P: 68, R: 16     Sleep:  Hours of sleep at night: 7    General Shift Summary  Patient has isolated to his room and is seen laying in bed watching t.v. He presents with a flat affect however is calm and cooperative, he will occasionally brighten upon approach. Patient denied SI/HI/AVH/SIB, he doesn't appear to be distracted or responding to internal stimuli. Patient voiced having mild anxiety and depression. Writer encouraged group and being involved in unit activities but patient refused stating that he wasn't interested. Hygiene is good. He is eating well. Concentration seems good. Incite and judgement are good to fair. A&O x4.     Patient is medication compliant with no voiced side effects.    Upon conversation with his mom, she stated that she had recently talked to him on the phone and questioned wether he was \"Feeling energy or hackers\". Per mother, patient said \"I don't want to talk about me.\".    Patient voiced wanting to discharge prior to the Superbowl. Patient is likely to discharge mid to end of next week.    Kizzy Morris RN MSN  "

## 2023-02-02 NOTE — PLAN OF CARE
Assessment/Intervention/Current Symtoms and Care Coordination  - Refer to psychosocial completed on 1/26/2023 for assessment/social functioning  - Chart review  - Team Meeting      He will return to group home upon stabilization.  Continues on a voluntary basis. Possible discharge next week.  AVS started.      Discharge Plan or Goal  Possible discharge next week middle or late week.Pending stabilization & development of a safe discharge plan.  Considerations include: Return to group home  AVS started with outpatient follow up appointments scheduled     Barriers to Discharge  Patient requires further psychiatric stabilization due to current symptomology     Referral Status  No referrals made yet     Legal Status  Patient is voluntary     Contacts:  Outpatient Psychiatrist: Dr. Greg Mensah, [facility] ([phone])  Outpatient Therapist: Matthew Emery, [facility], [phone]  Primary Care Provider: Dr. Lonny Wright, [facility] ([phone])  Mental Health : Chaitanya Cuevas at [facility] ([phone])  CADI : Nalini august Harwood ([phone])  [facility] : Dasia ([phone])  [facility] Home Nurse: Anthony ([phone])

## 2023-02-03 PROCEDURE — 99232 SBSQ HOSP IP/OBS MODERATE 35: CPT | Performed by: PSYCHIATRY & NEUROLOGY

## 2023-02-03 PROCEDURE — 124N000002 HC R&B MH UMMC

## 2023-02-03 PROCEDURE — 250N000013 HC RX MED GY IP 250 OP 250 PS 637: Performed by: PSYCHIATRY & NEUROLOGY

## 2023-02-03 RX ORDER — HALOPERIDOL 10 MG/1
10 TABLET ORAL AT BEDTIME
Status: DISCONTINUED | OUTPATIENT
Start: 2023-02-04 | End: 2023-02-09 | Stop reason: HOSPADM

## 2023-02-03 RX ORDER — HALOPERIDOL 5 MG/1
5 TABLET ORAL AT BEDTIME
Status: COMPLETED | OUTPATIENT
Start: 2023-02-03 | End: 2023-02-03

## 2023-02-03 RX ORDER — ZIPRASIDONE HYDROCHLORIDE 20 MG/1
20 CAPSULE ORAL 2 TIMES DAILY WITH MEALS
Status: COMPLETED | OUTPATIENT
Start: 2023-02-03 | End: 2023-02-04

## 2023-02-03 RX ORDER — HALOPERIDOL 5 MG/1
5 TABLET ORAL AT BEDTIME
Status: DISCONTINUED | OUTPATIENT
Start: 2023-02-03 | End: 2023-02-03

## 2023-02-03 RX ADMIN — ISOSORBIDE MONONITRATE 30 MG: 30 TABLET, EXTENDED RELEASE ORAL at 08:26

## 2023-02-03 RX ADMIN — DILTIAZEM HYDROCHLORIDE 120 MG: 120 CAPSULE, COATED, EXTENDED RELEASE ORAL at 08:27

## 2023-02-03 RX ADMIN — BENZTROPINE MESYLATE 0.5 MG: 0.5 TABLET ORAL at 08:26

## 2023-02-03 RX ADMIN — LITHIUM CARBONATE 600 MG: 300 TABLET, EXTENDED RELEASE ORAL at 21:02

## 2023-02-03 RX ADMIN — LITHIUM CARBONATE 300 MG: 300 TABLET, EXTENDED RELEASE ORAL at 08:26

## 2023-02-03 RX ADMIN — MIRTAZAPINE 15 MG: 15 TABLET, FILM COATED ORAL at 21:03

## 2023-02-03 RX ADMIN — METFORMIN HYDROCHLORIDE 1500 MG: 750 TABLET, EXTENDED RELEASE ORAL at 08:25

## 2023-02-03 RX ADMIN — ZIPRASIDONE HCL 20 MG: 20 CAPSULE ORAL at 18:16

## 2023-02-03 RX ADMIN — PROPRANOLOL HYDROCHLORIDE 20 MG: 10 TABLET ORAL at 13:20

## 2023-02-03 RX ADMIN — HALOPERIDOL 5 MG: 5 TABLET ORAL at 21:03

## 2023-02-03 RX ADMIN — PROPRANOLOL HYDROCHLORIDE 20 MG: 10 TABLET ORAL at 21:02

## 2023-02-03 RX ADMIN — LAMOTRIGINE 300 MG: 150 TABLET ORAL at 08:26

## 2023-02-03 RX ADMIN — HALOPERIDOL 5 MG: 5 TABLET ORAL at 08:26

## 2023-02-03 RX ADMIN — PROPRANOLOL HYDROCHLORIDE 20 MG: 10 TABLET ORAL at 08:26

## 2023-02-03 RX ADMIN — FLUTICASONE FUROATE AND VILANTEROL TRIFENATATE 1 PUFF: 100; 25 POWDER RESPIRATORY (INHALATION) at 08:27

## 2023-02-03 RX ADMIN — ZIPRASIDONE HCL 20 MG: 20 CAPSULE ORAL at 08:29

## 2023-02-03 RX ADMIN — LORATADINE 10 MG: 10 TABLET ORAL at 08:27

## 2023-02-03 ASSESSMENT — ACTIVITIES OF DAILY LIVING (ADL)
HYGIENE/GROOMING: INDEPENDENT
ADLS_ACUITY_SCORE: 30
HYGIENE/GROOMING: INDEPENDENT
ADLS_ACUITY_SCORE: 30
LAUNDRY: WITH SUPERVISION
ADLS_ACUITY_SCORE: 30
LAUNDRY: WITH SUPERVISION
ADLS_ACUITY_SCORE: 30
ORAL_HYGIENE: INDEPENDENT
DRESS: INDEPENDENT
ADLS_ACUITY_SCORE: 30
DRESS: INDEPENDENT
ADLS_ACUITY_SCORE: 30
ORAL_HYGIENE: INDEPENDENT
ADLS_ACUITY_SCORE: 30

## 2023-02-03 NOTE — PROGRESS NOTES
CLINICAL NUTRITION SERVICES - REASSESSMENT NOTE     Nutrition Prescription    RECOMMENDATIONS FOR MDs/PROVIDERS TO ORDER:  None.    Malnutrition Status:    Intake improved.  Patient does not meet two of the established criteria necessary for diagnosing malnutrition but is at risk for malnutrition.    Recommendations already ordered by Registered Dietitian (RD):  No changes.    Future/Additional Recommendations:  Monitor PO intake, supplement acceptance, and weight trends     EVALUATION OF THE PROGRESS TOWARD GOALS   Diet:Orders Placed This Encounter      Regular Diet Adult  Intake: nursing and md notes indicate good appetite, eating well.  Continues to have c/o dry mouth.  Sipping water, fluids and PRN Biotene.     NEW FINDINGS   Pt states he likes the [place] Ensure Enlive he is receiving in the afternoon. He states one is enough.  Weight is stable.     MALNUTRITION  % Intake: No decreased intake noted  % Weight Loss: Prior weight loss.  Wt stable since admission.  Subcutaneous Fat Loss: Unable to assess  Muscle Loss: Unable to assess  Fluid Accumulation/Edema: Unable to assess  Malnutrition Diagnosis: Patient does not meet two of the established criteria necessary for diagnosing malnutrition but is at risk for malnutrition    Previous Goals   Patient to consume % of nutritionally adequate meal trays TID, or the equivalent with supplements/snacks.  Evaluation: Met    Previous Nutrition Diagnosis  Inadequate oral intake related to decreased appetite and altered mental status as evidenced by pt report and 11% wt loss in 3.5 months.    Evaluation: Improving    CURRENT NUTRITION DIAGNOSIS  Predicted inadequate nutrient intake  related to decreased appetite and altered mental status as evidenced by pt report and 11% wt loss in 3.5 months.      INTERVENTIONS  Implementation  Medical food supplement therapy  Continue with current diet order Regular.    Goals  Patient to consume % of nutritionally adequate meal  trays TID, or the equivalent with supplements/snacks.    Monitoring/Evaluation  Progress toward goals will be monitored and evaluated per protocol.    Padmini Kaur, MPH, RDN, LD, [facility]  Pager: [phone]

## 2023-02-03 NOTE — PLAN OF CARE
Problem: Depressive Signs/Symptoms  Goal: Optimized Cognitive Function  Outcome: Progressing   Goal Outcome Evaluation:    Plan of Care Reviewed With: patient        Pt has been isolative to his room watching TV all shift. Pt is calm and pleasant. Pt rated depression and anxiety both at 6/10. Pt denies SI/SIB/HI and hallucinations. Pt is med compliant. Pt was able to come out of his room for his dinner tray and to return the tray to the dinner cart. Pt haD no social interaction with peers. No behavioral outbursts noted this shift.

## 2023-02-03 NOTE — PLAN OF CARE
Pt asleep at start of shift.     Breathing quiet and unlabored when sleeping.     Pt had no c/o pain or discomfort during the HS.       Goal Outcome Evaluation:    Problem: Sleep Disturbance  Goal: Adequate Sleep/Rest  Outcome: Progressing

## 2023-02-03 NOTE — PLAN OF CARE
"  Problem: Depressive Signs/Symptoms  Goal: Optimized Energy Level (Depressive Signs/Symptoms)  Outcome: Progressing  Intervention: Optimize Energy Level  Recent Flowsheet Documentation  Taken 2/3/2023 8630 by Magdiel North, RN  Activity (Behavioral Health): up ad chata   Goal Outcome Evaluation:    Plan of Care Reviewed With: patient    Pt mostly isolative in his room and came for both breakfast and lunch. Pt endorsed mild anxiety and depression but manageable and does not need any medical intervention. Pt presented with flat affect and brighter during interaction. Pt speech clear, coherent, and good eye contact. Pt refused attending groups despite encouragement from this writer. Good appetite, adequate fluid intake, voiding freely and no BM issue per pt. Denies pain and shortness of breath and vital sign stable. Complied with medication and no side effect observed or reported. /84 (BP Location: Left arm, Patient Position: Sitting, Cuff Size: Adult Large)   Pulse 75   Temp 98.3  F (36.8  C) (Oral)   Resp 18   Ht 1.88 m (6' 2\")   Wt 113.4 kg (250 lb)   SpO2 96%   BMI 32.10 kg/m                   "

## 2023-02-03 NOTE — PLAN OF CARE
"Assessment/Intervention/Current Symtoms and Care Coordination  - Refer to psychosocial completed on 1/26/2023 for assessment/social functioning  - Chart review  - Team Meeting - nurse reports Tim slept 6.75 hours, is isolative to his room, comes out of room for meal trays but no interactions with peers, is calm and pleasant on approach, denies SI/SIB, is taking his medications. Is moving to POD 2 this day.      WR received voicemail from Tim's mother Carito. WR called her back and talked for about 10 minutes. Mother stated she Tim was guarded while talking with her about symptoms and said he still felt some \"energy\". Mother expressed concern about him going back to group home to soon as he has had many repeat hospitalizations and changes in medications the past several months. WR agreed to relay these concerns to primary CTC's and have one of them return her call on Monday.     Discharge Plan or Goal  Possible discharge next week middle or late week.Pending stabilization & development of a safe discharge plan.  Considerations include: Return to group home  AVS started with outpatient follow up appointments scheduled     Barriers to Discharge  Patient requires further psychiatric stabilization due to current symptomology     Referral Status  No referrals made yet     Legal Status  Patient is voluntary     Contacts:  Outpatient Psychiatrist: Dr. Greg Mensah, [facility] ([phone])  Outpatient Therapist: Matthew Emery, [facility], [phone]  Primary Care Provider: Dr. Lonny Wright, [facility] ([phone])  Mental Health : Chaitanya Cuevas at [facility] ([phone])  CADI : Nalini at [facility] ([phone])  [facility] : Dasia ([phone])  [facility] Home Nurse: Anthony ([phone])        "

## 2023-02-03 NOTE — PROGRESS NOTES
"[facility], [place]   Psychiatric Progress Note  Hospital Day: 8        Interim History:   The patient's care was discussed with the treatment team during the daily team meeting and/or staff's chart notes were reviewed.  Staff report patient appeared calm and cooperative. He is withdrawn socially with a blunted affect but brightens on approach. Did come out of his room to have dinner, but otherwise quite isolative to his room. He is medication complaint and denies any pain or discomfort. He did not attend groups. Patient voiced having mild anxiety and depression.     Upon interview: Tim was interviewed in his room. He reports his mood is \"pretty good\" and states his medications are helping. He endorses ongoing dry mouth which Biotene spray helps temporarily, only a few minutes per his report. He would like to stop Cogentin as this is most likely the culprit and he is quite bothered by the dry mouth. Also reports feeling mildly sedated during the day. We discussed plan to continue Geodon taper, reduce Haldol dose slightly, and discontinue Cogentin. He was encouraged to ask for prn Cogentin if evidence of EPSE, though denies current EPSE and none was observed. Anthony denies tremors, stiffness, restlessness, posturing or abnormal movements. He was made aware of his pending lithium lab on 2/7/23 and states he desires to return to his group home after discharge. Discussed likely discharge next week if ongoing improvement noted. He is in agreement.     Suicidal ideation: denies current SI plan or intent. Future oriented.     Homicidal ideation: denies current or recent homicidal ideation or behaviors.     Psychotic symptoms: Patient denies current AH and VH. He denies paranoia. Denies worries about \"hackers.\"    Medication side effects reported: No significant side effects other than those noted above.    Acute medical concerns: as above    Other issues reported by patient: Patient had " "no further questions or concerns.           Medications:       haloperidol  7.5 mg Oral At Bedtime    And     benztropine  0.5 mg Oral At Bedtime     benztropine  0.5 mg Oral Daily     diltiazem ER COATED BEADS  120 mg Oral Daily     fluticasone-vilanterol  1 puff Inhalation Daily     haloperidol  5 mg Oral Daily     isosorbide mononitrate  30 mg Oral Daily     lamoTRIgine  300 mg Oral Daily     lithium ER  300 mg Oral Daily     lithium ER  600 mg Oral At Bedtime     loratadine  10 mg Oral Daily     metFORMIN  1,500 mg Oral Daily with breakfast     mirtazapine  15 mg Oral At Bedtime     propranolol  20 mg Oral TID     ziprasidone  20 mg Oral BID w/meals          Allergies:     Allergies   Allergen Reactions     No Known Drug Allergies           Labs:     No results found for this or any previous visit (from the past 24 hour(s)).       Psychiatric Examination:     /84 (BP Location: Left arm, Patient Position: Sitting, Cuff Size: Adult Large)   Pulse 75   Temp 98.3  F (36.8  C) (Oral)   Resp 18   Ht 1.88 m (6' 2\")   Wt 113.4 kg (250 lb)   SpO2 96%   BMI 32.10 kg/m    Weight is 250 lbs 0 oz  Body mass index is 32.1 kg/m .    Weight over time:  Vitals:    01/26/23 0357 01/31/23 0804   Weight: 113.4 kg (250 lb) 113.4 kg (250 lb)       Orthostatic Vitals     None        Cardiometabolic risk assessment. 01/27/23      Reviewed patient profile for cardiometabolic risk factors    Date taken /Value  REFERENCE RANGE   Abdominal Obesity  (Waist Circumference)   See nursing flowsheet Women ?35 in (88 cm)   Men ?40 in (102 cm)      Triglycerides  Triglycerides   Date Value Ref Range Status   02/21/2005 130 0 - 150 mg/dL Final       ?150 mg/dL (1.7 mmol/L) or current treatment for elevated triglycerides   HDL cholesterol  HDL Cholesterol   Date Value Ref Range Status   02/21/2005 42 >40 mg/dL Final   ]   Women <50 mg/dL (1.3 mmol/L) in women or current treatment for low HDL cholesterol  Men <40 mg/dL (1 mmol/L) in men " "or current treatment for low HDL cholesterol     Fasting plasma glucose (FPG) Lab Results   Component Value Date    GLC 94 01/24/2023    GLC 93 04/01/2016      FPG ?100 mg/dL (5.6 mmol/L) or treatment for elevated blood glucose   Blood pressure  BP Readings from Last 3 Encounters:   02/03/23 116/84   01/26/23 137/85   10/07/22 122/86    Blood pressure ?130/85 mmHg or treatment for elevated blood pressure   Family History  See family history     Appearance: awake, cooperative, lying in bed  Attitude: cooperative and calm  Eye Contact: good  Mood:  \"pretty good\"  Affect: mood congruent and appropriate  Speech:  clear, coherent and flattened prosody  Language: fluent in English  Psychomotor Behavior:  No evidence of tardive dyskinesia, dystonia, or tics  Gait/Station: normal  Thought Process:  linear, logical, goal oriented  Associations:  no loose associations  Thought Content:  Denying SI/HI/AVH; no overt symptoms of psychosis  Insight:  fair  Judgement: fair  Oriented to:  person, and place  Attention Span and Concentration: intact  Recent and Remote Memory:  intact  Fund of Knowledge: appropriate     Clinical Global Impressions  First:7     Most recent:4           Precautions:     Behavioral Orders   Procedures     Code 1 - Restrict to Unit     Routine Programming     As clinically indicated     Status 15     Every 15 minutes.     Suicide precautions     Patients on Suicide Precautions should have a Combination Diet ordered that includes a Diet selection(s) AND a Behavioral Tray selection for Safe Tray - with utensils, or Safe Tray - NO utensils            Diagnoses:     F25.1 Schizoaffective disorder, depressed type  F41.1 Generalized anxiety disorder  F15.90 Stimulant use disorder, severe, dependence - by history  Prolonged QTc         Assessment & Plan:     Assessment and hospital summary:  This patient is a 47 year old  male with history of schizoaffective disorder, bipolar disorder and stimulant use " disorder who presented to ED with paranoid ideation and delusions in context of medication non-adherence and increased psychosocial stressors. Patient was initially admitted to the [facility] unit, then transferred to station 12 due to paranoid delusion and severe anxiety posing a safety risk. Symptoms and presentation at this time is most consistent with Schizoaffective disorder. I have discussed the risks, benefits, and alternatives of psychiatric hospitalization and medication treatment, including both Haldol and clozapine. Patient is amenable to a trial of haloperidol. He wishes to avoid antipsychotics with a high likelihood of weight gain. Patient will likely benefit from close psychiatric follow up upon discharge.      Inpatient psychiatric hospitalization is warranted at this time for safety, stabilization, and possible adjustment in medications.    Hospital course:  2/2/23: Reporting ongoing dry mouth.     Target psychiatric symptoms and interventions:  Discontinue Cogentin due to side effect of dry mouth. PRN Cogentin remains available.   Reduce Haldol to a total of 10 mg daily. Will do 5 mg at bedtime this evening and then switch to 10 mg at bedtime beginning tomorrow as patient had AM dose already today. Monitor closely for re-emerging psychotic symptoms.   Continue hydroxyzine 25 mg q4h prn for acute anxiety  Continue lamotrigine 300 mg qday  Continue lithium 300 mg qday and 600 mg qhs. Initiated in [facility] unit. Lithium level reviewed. Repeat Li level pending on 2/7/23.    Continue mirtazapine 15 mg at bedtime  Continue propranolol 20 mg tid  Continue ziprasidone 20 mg bid Will taper and discontinue Geodon due to suspected EPSE 2/2 Geodon and prolonged QTc     Risks, benefits, and alternatives discussed at length with patient.     Acute Medical Problems and Treatments:  Prolonged QTc: QTc 472 on 1/24/23 in context of taking Geodon total of 160 mg daily. Likely 2/2 Geodon. Will repeat EKG prior to discharge.      Dry mouth: Discontinuing Cogentin as it is most likely the culprit and added Biotene prn on 1/30. Encouraged fluids. Reducing Haldol slightly as well.     Monitor closely for evidence of EPSE. Cogentin PRN available.     Behavioral/Psychological/Social:  - Encourage unit programming    Safety:  - Continue precautions as noted above  - Status 15 minute checks    Legal Status: voluntary    Disposition Plan   Reason for ongoing admission: is unable to care for self due to severe psychosis or song  Discharge location: group home  Discharge Medications: not ordered  Follow-up Appointments: not scheduled    Entered by: Gala Enamorado MD on 2/3/2023 at 9:39 AM

## 2023-02-04 PROCEDURE — 250N000013 HC RX MED GY IP 250 OP 250 PS 637: Performed by: PSYCHIATRY & NEUROLOGY

## 2023-02-04 PROCEDURE — 124N000002 HC R&B MH UMMC

## 2023-02-04 RX ADMIN — PROPRANOLOL HYDROCHLORIDE 20 MG: 10 TABLET ORAL at 08:30

## 2023-02-04 RX ADMIN — LAMOTRIGINE 300 MG: 150 TABLET ORAL at 08:31

## 2023-02-04 RX ADMIN — HALOPERIDOL 10 MG: 10 TABLET ORAL at 19:15

## 2023-02-04 RX ADMIN — METFORMIN HYDROCHLORIDE 1500 MG: 750 TABLET, EXTENDED RELEASE ORAL at 08:30

## 2023-02-04 RX ADMIN — PROPRANOLOL HYDROCHLORIDE 20 MG: 10 TABLET ORAL at 19:15

## 2023-02-04 RX ADMIN — FLUTICASONE FUROATE AND VILANTEROL TRIFENATATE 1 PUFF: 100; 25 POWDER RESPIRATORY (INHALATION) at 08:31

## 2023-02-04 RX ADMIN — ZIPRASIDONE HCL 20 MG: 20 CAPSULE ORAL at 18:04

## 2023-02-04 RX ADMIN — LITHIUM CARBONATE 600 MG: 300 TABLET, EXTENDED RELEASE ORAL at 19:14

## 2023-02-04 RX ADMIN — ZIPRASIDONE HCL 20 MG: 20 CAPSULE ORAL at 08:30

## 2023-02-04 RX ADMIN — PROPRANOLOL HYDROCHLORIDE 20 MG: 10 TABLET ORAL at 14:13

## 2023-02-04 RX ADMIN — MIRTAZAPINE 15 MG: 15 TABLET, FILM COATED ORAL at 19:15

## 2023-02-04 RX ADMIN — DILTIAZEM HYDROCHLORIDE 120 MG: 120 CAPSULE, COATED, EXTENDED RELEASE ORAL at 08:30

## 2023-02-04 RX ADMIN — LITHIUM CARBONATE 300 MG: 300 TABLET, EXTENDED RELEASE ORAL at 08:31

## 2023-02-04 RX ADMIN — ISOSORBIDE MONONITRATE 30 MG: 30 TABLET, EXTENDED RELEASE ORAL at 08:30

## 2023-02-04 RX ADMIN — LORATADINE 10 MG: 10 TABLET ORAL at 08:30

## 2023-02-04 ASSESSMENT — ACTIVITIES OF DAILY LIVING (ADL)
ORAL_HYGIENE: INDEPENDENT
ADLS_ACUITY_SCORE: 30
HYGIENE/GROOMING: INDEPENDENT
DRESS: INDEPENDENT
ADLS_ACUITY_SCORE: 30
LAUNDRY: WITH SUPERVISION
ADLS_ACUITY_SCORE: 30

## 2023-02-04 NOTE — PLAN OF CARE
Problem: Adult Behavioral Health Plan of Care  Goal: Adheres to Safety Considerations for Self and Others  Outcome: Progressing  Intervention: Develop and Maintain Individualized Safety Plan  Recent Flowsheet Documentation  Taken 2/3/2023 1632 by Magdiel North, RN  Safety Measures: safety rounds completed   Goal Outcome Evaluation:    Plan of Care Reviewed With: patient        Pt mostly isolative in his room and only came out for meals and bathroom. Pt endorsed mild anxiety and denies all other mental health psych symptoms and contracted for safety. Pt presented with brighter mood during interaction and appreciative of care and treatment. Complied with medication and treatment and no side effect observed or reported. Pt speech is clear and coherent, soft spoken and good eye contact. Adequate fluid intake, good appetite, voiding freely and no BM issue per pt.

## 2023-02-04 NOTE — PLAN OF CARE
Problem: Sleep Disturbance  Goal: Adequate Sleep/Rest  Outcome: Not Progressing   Goal Outcome Evaluation:                  Slept most of the shift. Declined to have interview with RN. Reports that he is ok today. Only comes of for meals. All medications delivered to his room. Avoids contact with staff and peers. Appears depressed. Affect is flat and blunted. Offers no physical complaints.

## 2023-02-04 NOTE — PROGRESS NOTES
Pt slept through the shift without any problems. Checked on patient in every 15 minutes, he remained safe. Will continue to monitor patient.

## 2023-02-05 PROCEDURE — 124N000002 HC R&B MH UMMC

## 2023-02-05 PROCEDURE — 250N000013 HC RX MED GY IP 250 OP 250 PS 637: Performed by: PSYCHIATRY & NEUROLOGY

## 2023-02-05 RX ADMIN — HALOPERIDOL 10 MG: 10 TABLET ORAL at 19:22

## 2023-02-05 RX ADMIN — LITHIUM CARBONATE 300 MG: 300 TABLET, EXTENDED RELEASE ORAL at 09:57

## 2023-02-05 RX ADMIN — METFORMIN HYDROCHLORIDE 1500 MG: 750 TABLET, EXTENDED RELEASE ORAL at 09:57

## 2023-02-05 RX ADMIN — LAMOTRIGINE 300 MG: 150 TABLET ORAL at 09:57

## 2023-02-05 RX ADMIN — PROPRANOLOL HYDROCHLORIDE 20 MG: 10 TABLET ORAL at 09:58

## 2023-02-05 RX ADMIN — FLUTICASONE FUROATE AND VILANTEROL TRIFENATATE 1 PUFF: 100; 25 POWDER RESPIRATORY (INHALATION) at 10:20

## 2023-02-05 RX ADMIN — PROPRANOLOL HYDROCHLORIDE 20 MG: 10 TABLET ORAL at 19:22

## 2023-02-05 RX ADMIN — PROPRANOLOL HYDROCHLORIDE 20 MG: 10 TABLET ORAL at 13:43

## 2023-02-05 RX ADMIN — ISOSORBIDE MONONITRATE 30 MG: 30 TABLET, EXTENDED RELEASE ORAL at 09:57

## 2023-02-05 RX ADMIN — DILTIAZEM HYDROCHLORIDE 120 MG: 120 CAPSULE, COATED, EXTENDED RELEASE ORAL at 09:58

## 2023-02-05 RX ADMIN — LITHIUM CARBONATE 600 MG: 300 TABLET, EXTENDED RELEASE ORAL at 19:22

## 2023-02-05 RX ADMIN — MIRTAZAPINE 15 MG: 15 TABLET, FILM COATED ORAL at 19:22

## 2023-02-05 RX ADMIN — LORATADINE 10 MG: 10 TABLET ORAL at 09:57

## 2023-02-05 ASSESSMENT — ACTIVITIES OF DAILY LIVING (ADL)
ADLS_ACUITY_SCORE: 30
DRESS: INDEPENDENT
DRESS: INDEPENDENT
HYGIENE/GROOMING: INDEPENDENT
ADLS_ACUITY_SCORE: 30
ADLS_ACUITY_SCORE: 30
LAUNDRY: WITH SUPERVISION
LAUNDRY: WITH SUPERVISION
ADLS_ACUITY_SCORE: 30
ORAL_HYGIENE: INDEPENDENT
ORAL_HYGIENE: INDEPENDENT
HYGIENE/GROOMING: INDEPENDENT
ADLS_ACUITY_SCORE: 30
ADLS_ACUITY_SCORE: 30

## 2023-02-05 NOTE — PLAN OF CARE
Problem: Depressive Signs/Symptoms  Goal: Optimized Energy Level (Depressive Signs/Symptoms)  Intervention: Optimize Energy Level  Recent Flowsheet Documentation  Taken 2/5/2023 1007 by Katina Russell RN  Diversional Activity: television  Activity (Behavioral Health): up ad chata   Goal Outcome Evaluation:    Plan of Care Reviewed With: patient        Patient appeared unkempt and sad. Patient endorsed depression 7/10. Denied pain, anxiety, SI/HI and other mental health symptoms. Patient was isolative and withdrawn in his room, only went out for meals. Patient ate 75% of his meals. Patient was visited by both parents. No PRNs given this shift.

## 2023-02-05 NOTE — PLAN OF CARE
Pt asleep at start of shift.     Breathing quiet and unlabored when sleeping.     Pt had no c/o pain or discomfort during the HS.     Appears to have slept 6.5 hours.     Goal Outcome Evaluation:  Problem: Sleep Disturbance  Goal: Adequate Sleep/Rest  Outcome: Progressing

## 2023-02-05 NOTE — PLAN OF CARE
Problem: Suicide Risk  Goal: Absence of Self-Harm  Outcome: Progressing   Goal Outcome Evaluation:  Pt was visible in the lounge briefly. Was withdrawn, guarded and keeping to himself. However, pt took his medications as prescribed, denied having safety concerns including thoughts of harming self or others and Denied auditory and visual hallucinations. No medication side effects reported or noted. Pt ate dinner in the lounge.   Plan: Status 15s; Build trust with pt. Continue to build on strengths and group attendance.

## 2023-02-06 PROCEDURE — 124N000002 HC R&B MH UMMC

## 2023-02-06 PROCEDURE — 99232 SBSQ HOSP IP/OBS MODERATE 35: CPT | Performed by: PSYCHIATRY & NEUROLOGY

## 2023-02-06 PROCEDURE — 250N000013 HC RX MED GY IP 250 OP 250 PS 637: Performed by: PSYCHIATRY & NEUROLOGY

## 2023-02-06 RX ADMIN — DILTIAZEM HYDROCHLORIDE 120 MG: 120 CAPSULE, COATED, EXTENDED RELEASE ORAL at 08:10

## 2023-02-06 RX ADMIN — METFORMIN HYDROCHLORIDE 1500 MG: 750 TABLET, EXTENDED RELEASE ORAL at 08:10

## 2023-02-06 RX ADMIN — LITHIUM CARBONATE 300 MG: 300 TABLET, EXTENDED RELEASE ORAL at 08:10

## 2023-02-06 RX ADMIN — LITHIUM CARBONATE 600 MG: 300 TABLET, EXTENDED RELEASE ORAL at 19:44

## 2023-02-06 RX ADMIN — HALOPERIDOL 10 MG: 10 TABLET ORAL at 19:44

## 2023-02-06 RX ADMIN — LORATADINE 10 MG: 10 TABLET ORAL at 08:10

## 2023-02-06 RX ADMIN — PROPRANOLOL HYDROCHLORIDE 20 MG: 10 TABLET ORAL at 19:45

## 2023-02-06 RX ADMIN — FLUTICASONE FUROATE AND VILANTEROL TRIFENATATE 1 PUFF: 100; 25 POWDER RESPIRATORY (INHALATION) at 08:11

## 2023-02-06 RX ADMIN — LAMOTRIGINE 300 MG: 150 TABLET ORAL at 08:10

## 2023-02-06 RX ADMIN — PROPRANOLOL HYDROCHLORIDE 20 MG: 10 TABLET ORAL at 14:38

## 2023-02-06 RX ADMIN — PROPRANOLOL HYDROCHLORIDE 20 MG: 10 TABLET ORAL at 08:10

## 2023-02-06 RX ADMIN — MIRTAZAPINE 15 MG: 15 TABLET, FILM COATED ORAL at 19:44

## 2023-02-06 RX ADMIN — ISOSORBIDE MONONITRATE 30 MG: 30 TABLET, EXTENDED RELEASE ORAL at 08:10

## 2023-02-06 ASSESSMENT — ACTIVITIES OF DAILY LIVING (ADL)
ADLS_ACUITY_SCORE: 30
ADLS_ACUITY_SCORE: 30
HYGIENE/GROOMING: INDEPENDENT
ORAL_HYGIENE: INDEPENDENT
ADLS_ACUITY_SCORE: 30
LAUNDRY: UNABLE TO COMPLETE
HYGIENE/GROOMING: INDEPENDENT
DRESS: INDEPENDENT;SCRUBS (BEHAVIORAL HEALTH)
ADLS_ACUITY_SCORE: 30
ORAL_HYGIENE: INDEPENDENT
DRESS: INDEPENDENT
ADLS_ACUITY_SCORE: 30

## 2023-02-06 NOTE — PLAN OF CARE
Problem: Depressive Signs/Symptoms  Goal: Increased Participation and Engagement (Depressive Signs/Symptoms)  Outcome: Not Progressing   Goal Outcome Evaluation:    Plan of Care Reviewed With: patient      Pt was isolative and withdrawn to his room almost the entire shift. He continued to neglect his hygiene despite prompting. Pt was visible in the lounge to eat dinner. Pt took his medications as prescribed. Pt was guarded and brief during check-in. However, he denied having safety concerns including thoughts of harming self or others. Denied auditory and visual hallucinations.   Plan: Status 15s; Build trust with pt. Continue to build on strengths. Encourage compliance and healthy coping.

## 2023-02-06 NOTE — PLAN OF CARE
"Pt was isolative and withdrawn to his room almost the entire shift.  Pt denies SI, HI, SIB and thoughts of hurting others. Pt denies A/V hallucinations. Pt was calm, cooperative and med compliant.    ADLs not completed despite prompting. Appetite good, fluid intake adequate.    VS reviewed: /71 (BP Location: Left leg, Patient Position: Sitting, Cuff Size: Adult Large)   Pulse 60   Temp 97.4  F (36.3  C) (Temporal)   Resp 16   Ht 1.88 m (6' 2\")   Wt 113.4 kg (250 lb)   SpO2 96%   BMI 32.10 kg/m   . Patient denies pain.    Length of stay: 11  "
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
## 2023-02-06 NOTE — PLAN OF CARE
Pt asleep at start of shift.     Breathing quiet and unlabored when sleeping.     Pt had no c/o pain or discomfort during the HS.     Appears to have slept 5.75 hours.     Goal Outcome Evaluation:  Problem: Sleep Disturbance  Goal: Adequate Sleep/Rest  Outcome: Progressing

## 2023-02-06 NOTE — PLAN OF CARE
Assessment/Intervention/Current Symtoms and Care Coordination  - Refer to psychosocial completed on 1/26/2023 for assessment/social functioning  - Chart review  - Team Meeting    - Writer called [facility] - Dasia ([phone]) - voice mailbox full   - Writer called [facility] - Anthony ([phone]) - Informed her that Anthony will likely be d/c'ing later this week.  Fax Orders to Anthony at fax: [phone]  They are good with him returning later this week.  They want to have a nurse to nurse with our team - Anthony ([phone]) .  Either Mom can transport (typical) or they can.  Once we know what day we can follow-up with them.     Medications can be sent to [facility] in [place]     Discharge Plan or Goal  Possible discharge later this week - return to group home.   Considerations include: Return to group home  AVS started with outpatient follow up appointments scheduled     Barriers to Discharge  Patient requires further psychiatric stabilization due to current symptomology - nearing baseline     Referral Status  None      Legal Status  Patient is voluntary     Contacts:  Outpatient Psychiatrist: Dr. Greg Mensah, [facility] ([phone])  Outpatient Therapist: Matthew Emery, [facility], [phone]  Primary Care Provider: Dr. Lonny rWight, [facility] ([phone])  Mental Health : Chaitanya Cuevas at [facility] ([phone])  CADI : Nalini at [place] ([phone])  [facility] : Dasia ([phone])  [facility] [place] Nurse: Anthony ([phone])

## 2023-02-07 LAB — LITHIUM SERPL-SCNC: 0.7 MMOL/L (ref 0.6–1.2)

## 2023-02-07 PROCEDURE — 250N000013 HC RX MED GY IP 250 OP 250 PS 637: Performed by: PSYCHIATRY & NEUROLOGY

## 2023-02-07 PROCEDURE — 124N000002 HC R&B MH UMMC

## 2023-02-07 PROCEDURE — 36415 COLL VENOUS BLD VENIPUNCTURE: CPT | Performed by: PSYCHIATRY & NEUROLOGY

## 2023-02-07 PROCEDURE — H2032 ACTIVITY THERAPY, PER 15 MIN: HCPCS

## 2023-02-07 PROCEDURE — 80178 ASSAY OF LITHIUM: CPT | Performed by: PSYCHIATRY & NEUROLOGY

## 2023-02-07 RX ADMIN — HALOPERIDOL 10 MG: 10 TABLET ORAL at 20:23

## 2023-02-07 RX ADMIN — ISOSORBIDE MONONITRATE 30 MG: 30 TABLET, EXTENDED RELEASE ORAL at 08:37

## 2023-02-07 RX ADMIN — LORATADINE 10 MG: 10 TABLET ORAL at 08:37

## 2023-02-07 RX ADMIN — FLUTICASONE FUROATE AND VILANTEROL TRIFENATATE 1 PUFF: 100; 25 POWDER RESPIRATORY (INHALATION) at 08:38

## 2023-02-07 RX ADMIN — LAMOTRIGINE 300 MG: 150 TABLET ORAL at 08:37

## 2023-02-07 RX ADMIN — DILTIAZEM HYDROCHLORIDE 120 MG: 120 CAPSULE, COATED, EXTENDED RELEASE ORAL at 08:37

## 2023-02-07 RX ADMIN — MIRTAZAPINE 15 MG: 15 TABLET, FILM COATED ORAL at 20:23

## 2023-02-07 RX ADMIN — PROPRANOLOL HYDROCHLORIDE 20 MG: 10 TABLET ORAL at 20:22

## 2023-02-07 RX ADMIN — LITHIUM CARBONATE 600 MG: 300 TABLET, EXTENDED RELEASE ORAL at 20:22

## 2023-02-07 RX ADMIN — LITHIUM CARBONATE 300 MG: 300 TABLET, EXTENDED RELEASE ORAL at 08:37

## 2023-02-07 RX ADMIN — PROPRANOLOL HYDROCHLORIDE 20 MG: 10 TABLET ORAL at 08:37

## 2023-02-07 RX ADMIN — METFORMIN HYDROCHLORIDE 1500 MG: 750 TABLET, EXTENDED RELEASE ORAL at 08:37

## 2023-02-07 ASSESSMENT — ACTIVITIES OF DAILY LIVING (ADL)
ADLS_ACUITY_SCORE: 30
DRESS: SCRUBS (BEHAVIORAL HEALTH);INDEPENDENT
HYGIENE/GROOMING: HANDWASHING;INDEPENDENT
ADLS_ACUITY_SCORE: 30
ADLS_ACUITY_SCORE: 30
ORAL_HYGIENE: INDEPENDENT
ADLS_ACUITY_SCORE: 30
LAUNDRY: UNABLE TO COMPLETE
ADLS_ACUITY_SCORE: 30

## 2023-02-07 NOTE — PLAN OF CARE
"  Problem: Adult Behavioral Health Plan of Care  Goal: Optimized Coping Skills in Response to Life Stressors  Outcome: Progressing     Problem: Suicide Risk  Goal: Absence of Self-Harm  Outcome: Progressing   Goal Outcome Evaluation:    Plan of Care Reviewed With: patient      Behavioral  Pt spent the majority of the shift sleeping or napping in his room. Pt mood was depressed and presented with a flat, depressed affect. Pt said he did not know the cause of his depression which he rated at 5/10. He rated anxiety as \"low\" Pt denies SI thoughts or AVH. Pt was cooperative and compliant with medications. He endorses a good appetite. Staff will continue to monitor.   Medical Alerts  None  Plan  Continue to monitor     "

## 2023-02-07 NOTE — PLAN OF CARE
Problem: Adult Behavioral Health Plan of Care  Goal: Adheres to Safety Considerations for Self and Others  Outcome: Adequate for Care Transition   Goal Outcome Evaluation:    Plan of Care Reviewed With: patient    Pt with flat affect and isolative behavior . Up ate and took his medication without any problem . approach pt denied anxiety, depression, SI/SIB and hallucination . Pt told writer he slept ok doing the night . He offered no c/o pain or discomfort . Will continue POC

## 2023-02-07 NOTE — PLAN OF CARE
Goal Outcome Evaluation:         Neftaly appeared to sleep a total of 7 hours this night shift.  No prns or snacks given or requested.  Appears comfortable and expressed no concerns during the night.  Lithium level to be done this morning.

## 2023-02-07 NOTE — PLAN OF CARE
Assessment/Intervention/Current Symtoms and Care Coordination  - Refer to psychosocial completed on 1/26/2023 for assessment/social functioning  - Chart review  - Team Meeting  - Pt was isolative last evening, staff report he continues to respond to internal stimuli.     - Pt to discharge by end of week. Group home is aware.  Contact there is [facility]  RN - Anthony ([phone]) Fax Orders to:  [phone]  They want to have a nurse to nurse with our team - Anthony ([phone]) .    Either Mom can transport (typical) or they can.  Once we know what day we can follow-up with them.      Medications can be sent to [facility] in [place]     Discharge Plan or Goal  Possible discharge later this week - return to group home.   Considerations include: Return to group home  AVS started with outpatient follow up appointments scheduled     Barriers to Discharge  Patient requires further psychiatric stabilization due to current symptomology - nearing baseline     Referral Status  None      Legal Status  Patient is voluntary     Contacts:  Outpatient Psychiatrist: Dr. Greg Mensah, [facility] ([phone])  Outpatient Therapist: Matthew Emery, [facility], [phone]  Primary Care Provider: Dr. Lonny Wright, [facility] ([phone])  Mental Health : Chaitanya Cuevas at [facility] ([phone])  CADI : Nalini at [place] ([phone])  [facility] : Dasia ([phone])  [facility] Nurse: Anthony ([phone])

## 2023-02-08 LAB — LITHIUM SERPL-SCNC: 0.8 MMOL/L (ref 0.6–1.2)

## 2023-02-08 PROCEDURE — 250N000013 HC RX MED GY IP 250 OP 250 PS 637: Performed by: PSYCHIATRY & NEUROLOGY

## 2023-02-08 PROCEDURE — 124N000002 HC R&B MH UMMC

## 2023-02-08 PROCEDURE — 36415 COLL VENOUS BLD VENIPUNCTURE: CPT | Performed by: PSYCHIATRY & NEUROLOGY

## 2023-02-08 PROCEDURE — 80178 ASSAY OF LITHIUM: CPT | Performed by: PSYCHIATRY & NEUROLOGY

## 2023-02-08 PROCEDURE — 99232 SBSQ HOSP IP/OBS MODERATE 35: CPT | Performed by: PSYCHIATRY & NEUROLOGY

## 2023-02-08 RX ORDER — FLUTICASONE FUROATE AND VILANTEROL TRIFENATATE 100; 25 UG/1; UG/1
1 POWDER RESPIRATORY (INHALATION) DAILY
Qty: 28 EACH | Refills: 0 | Status: SHIPPED | OUTPATIENT
Start: 2023-02-08

## 2023-02-08 RX ORDER — MIRTAZAPINE 15 MG/1
15 TABLET, FILM COATED ORAL AT BEDTIME
Qty: 30 TABLET | Refills: 0 | Status: SHIPPED | OUTPATIENT
Start: 2023-02-08

## 2023-02-08 RX ORDER — LITHIUM CARBONATE 300 MG/1
600 TABLET, FILM COATED, EXTENDED RELEASE ORAL AT BEDTIME
Status: COMPLETED | OUTPATIENT
Start: 2023-02-08 | End: 2023-02-08

## 2023-02-08 RX ORDER — PROPRANOLOL HYDROCHLORIDE 10 MG/1
10 TABLET ORAL 3 TIMES DAILY
Qty: 90 TABLET | Refills: 0 | Status: SHIPPED | OUTPATIENT
Start: 2023-02-08

## 2023-02-08 RX ORDER — LITHIUM CARBONATE 450 MG
900 TABLET, EXTENDED RELEASE ORAL AT BEDTIME
Status: DISCONTINUED | OUTPATIENT
Start: 2023-02-09 | End: 2023-02-09 | Stop reason: HOSPADM

## 2023-02-08 RX ORDER — METFORMIN HCL 500 MG
1500 TABLET, EXTENDED RELEASE 24 HR ORAL EVERY MORNING
Qty: 90 TABLET | Refills: 0 | Status: SHIPPED | OUTPATIENT
Start: 2023-02-08

## 2023-02-08 RX ORDER — DILTIAZEM HYDROCHLORIDE 120 MG/1
120 CAPSULE, EXTENDED RELEASE ORAL DAILY
Qty: 30 CAPSULE | Refills: 0 | Status: SHIPPED | OUTPATIENT
Start: 2023-02-08

## 2023-02-08 RX ORDER — LITHIUM CARBONATE 450 MG
900 TABLET, EXTENDED RELEASE ORAL AT BEDTIME
Qty: 60 TABLET | Refills: 0 | Status: SHIPPED | OUTPATIENT
Start: 2023-02-09

## 2023-02-08 RX ORDER — LAMOTRIGINE 150 MG/1
300 TABLET ORAL EVERY MORNING
Qty: 60 TABLET | Refills: 0 | Status: SHIPPED | OUTPATIENT
Start: 2023-02-08

## 2023-02-08 RX ORDER — HALOPERIDOL 10 MG/1
10 TABLET ORAL AT BEDTIME
Qty: 30 TABLET | Refills: 0 | Status: SHIPPED | OUTPATIENT
Start: 2023-02-08

## 2023-02-08 RX ORDER — LANOLIN ALCOHOL/MO/W.PET/CERES
3 CREAM (GRAM) TOPICAL
Qty: 30 TABLET | Refills: 0 | Status: SHIPPED | OUTPATIENT
Start: 2023-02-08

## 2023-02-08 RX ORDER — PROPRANOLOL HYDROCHLORIDE 10 MG/1
10 TABLET ORAL 3 TIMES DAILY
Status: DISCONTINUED | OUTPATIENT
Start: 2023-02-08 | End: 2023-02-09 | Stop reason: HOSPADM

## 2023-02-08 RX ORDER — ALBUTEROL SULFATE 90 UG/1
1-2 AEROSOL, METERED RESPIRATORY (INHALATION) EVERY 4 HOURS PRN
Qty: 18 G | Refills: 0 | Status: SHIPPED | OUTPATIENT
Start: 2023-02-08

## 2023-02-08 RX ORDER — SALIVA STIMULANT COMB. NO.3
2 SPRAY, NON-AEROSOL (ML) MUCOUS MEMBRANE 4 TIMES DAILY PRN
Qty: 44.3 ML | Refills: 0 | Status: SHIPPED | OUTPATIENT
Start: 2023-02-08

## 2023-02-08 RX ORDER — HYDROXYZINE HYDROCHLORIDE 25 MG/1
25 TABLET, FILM COATED ORAL 3 TIMES DAILY PRN
Qty: 90 TABLET | Refills: 0 | Status: SHIPPED | OUTPATIENT
Start: 2023-02-08

## 2023-02-08 RX ORDER — POLYETHYLENE GLYCOL 3350 17 G/17G
17 POWDER, FOR SOLUTION ORAL DAILY
Qty: 510 G | Refills: 0 | Status: SHIPPED | OUTPATIENT
Start: 2023-02-08

## 2023-02-08 RX ORDER — LORATADINE 10 MG/1
10 CAPSULE, LIQUID FILLED ORAL DAILY
Qty: 30 CAPSULE | Refills: 0 | Status: SHIPPED | OUTPATIENT
Start: 2023-02-08

## 2023-02-08 RX ORDER — ISOSORBIDE MONONITRATE 30 MG/1
30 TABLET, EXTENDED RELEASE ORAL DAILY
Qty: 30 TABLET | Refills: 0 | Status: SHIPPED | OUTPATIENT
Start: 2023-02-08

## 2023-02-08 RX ORDER — BENZTROPINE MESYLATE 1 MG/1
1 TABLET ORAL 2 TIMES DAILY PRN
Qty: 60 TABLET | Refills: 0 | Status: SHIPPED | OUTPATIENT
Start: 2023-02-08

## 2023-02-08 RX ADMIN — DILTIAZEM HYDROCHLORIDE 120 MG: 120 CAPSULE, COATED, EXTENDED RELEASE ORAL at 08:26

## 2023-02-08 RX ADMIN — METFORMIN HYDROCHLORIDE 1500 MG: 750 TABLET, EXTENDED RELEASE ORAL at 08:25

## 2023-02-08 RX ADMIN — ISOSORBIDE MONONITRATE 30 MG: 30 TABLET, EXTENDED RELEASE ORAL at 08:26

## 2023-02-08 RX ADMIN — MIRTAZAPINE 15 MG: 15 TABLET, FILM COATED ORAL at 19:47

## 2023-02-08 RX ADMIN — LAMOTRIGINE 300 MG: 150 TABLET ORAL at 08:25

## 2023-02-08 RX ADMIN — PROPRANOLOL HYDROCHLORIDE 10 MG: 10 TABLET ORAL at 19:47

## 2023-02-08 RX ADMIN — HALOPERIDOL 10 MG: 10 TABLET ORAL at 19:47

## 2023-02-08 RX ADMIN — LITHIUM CARBONATE 300 MG: 300 TABLET, EXTENDED RELEASE ORAL at 08:25

## 2023-02-08 RX ADMIN — LORATADINE 10 MG: 10 TABLET ORAL at 08:25

## 2023-02-08 RX ADMIN — HYDROXYZINE HYDROCHLORIDE 25 MG: 25 TABLET, FILM COATED ORAL at 08:32

## 2023-02-08 RX ADMIN — FLUTICASONE FUROATE AND VILANTEROL TRIFENATATE 1 PUFF: 100; 25 POWDER RESPIRATORY (INHALATION) at 08:25

## 2023-02-08 RX ADMIN — PROPRANOLOL HYDROCHLORIDE 20 MG: 10 TABLET ORAL at 08:26

## 2023-02-08 RX ADMIN — LITHIUM CARBONATE 600 MG: 300 TABLET, EXTENDED RELEASE ORAL at 19:47

## 2023-02-08 ASSESSMENT — ACTIVITIES OF DAILY LIVING (ADL)
ADLS_ACUITY_SCORE: 30
DRESS: SCRUBS (BEHAVIORAL HEALTH)
ADLS_ACUITY_SCORE: 40
LAUNDRY: UNABLE TO COMPLETE
ADLS_ACUITY_SCORE: 30
ADLS_ACUITY_SCORE: 30
ADLS_ACUITY_SCORE: 40
ADLS_ACUITY_SCORE: 30
ORAL_HYGIENE: INDEPENDENT
ADLS_ACUITY_SCORE: 30
ADLS_ACUITY_SCORE: 40
DRESS: INDEPENDENT
ADLS_ACUITY_SCORE: 30
ORAL_HYGIENE: INDEPENDENT
LAUNDRY: WITH SUPERVISION
HYGIENE/GROOMING: INDEPENDENT
ADLS_ACUITY_SCORE: 30
HYGIENE/GROOMING: HANDWASHING;SHOWER;INDEPENDENT;PROMPTS

## 2023-02-08 NOTE — PROGRESS NOTES
2/8: Gauri from [facility] ([phone]) called and needs provider verification for insurance purposes for possible interactions between Hydroxyzine and Benztropine.

## 2023-02-08 NOTE — PLAN OF CARE
Problem: Adult Behavioral Health Plan of Care  Goal: Plan of Care Review  Outcome: Progressing  Flowsheets  Taken 2/7/2023 2031  Plan of Care Reviewed With: patient  Overall Patient Progress: improving  Patient Agreement with Plan of Care: agrees  Taken 2/7/2023 1832  Patient Agreement with Plan of Care: agrees   Goal Outcome Evaluation:    Plan of Care Reviewed With: patient Plan of Care Reviewed With: patient    Overall Patient Progress: improvingOverall Patient Progress: improving     Patient early in the evening independent with attending group, interacting with staff and peers. Patient after group isolative and withdrawn to room remainder of the evening watching television and resting in bed. Patient upon approach flat in affect, calm and cooperative with verbal assessment. Patient reports that he continues to feel improvement reporting no SI/SIB, or paranoia that he had previously been experiencing. Patient stating acceptance with care plan and hopeful for discharge back to his group home. Patient diet appeared good eating 100% of dinner tray. Patient cooperative with vital sign assessment which were stable. Patient accepting of HS medications with no stated or observed side effects. Patient independent with identifying needs and completing ADL's, identifying plan to complete a shower tomorrow.

## 2023-02-08 NOTE — PLAN OF CARE
Goal Outcome Evaluation:    Plan of Care Reviewed With: patient      Problem: Depressive Signs/Symptoms  Goal: Improved Mood Symptoms (Depressive Signs/Symptoms)  Outcome: Progressing  Intervention: Promote Mood Improvement  Recent Flowsheet Documentation  Taken 2/8/2023 0900 by Kaye Herron RN  Diversional Activity: television     Problem: Depressive Signs/Symptoms  Goal: Improved Mood Symptoms (Depressive Signs/Symptoms)  Intervention: Promote Mood Improvement  Recent Flowsheet Documentation  Taken 2/8/2023 0900 by Kaye Herron RN  Diversional Activity: television     Patient presented with flat blunted affect but pleasant on approach. Patient noted to be isolative in his room. Patient ate his meal in his room. During assessment, patient endorsed anxiety of 6. He denied depression, SI/HI and hallucinations. Patient was compliant with his morning medications. PRN Hydroxyzine was administered at 0832 for high anxiety.     Per discharge plan, patient will go back to his group home tomorrow. RN did nurse to nurse report with Anthony at the group home. Medications will not be filled in our pharmacy.     Patient's vital signs this afternoon were BP 92/60, HR 61 lying, BP 93/63 HR 61 sitting, and BP 85/59, HR 78 standing. Data shared with the provider and  afternoon dose of Propranolol was held on Dr. Enamorado's order.

## 2023-02-08 NOTE — PLAN OF CARE
Problem: Sleep Disturbance  Goal: Adequate Sleep/Rest  Outcome: Progressing   Goal Outcome Evaluation:    Patient appeared to sleep 7 hours this night shift.  No prns or snacks given or requested.  No concerns were reported or noted.  [place] lab this morning.

## 2023-02-08 NOTE — PLAN OF CARE
Assessment/Intervention/Current Symtoms and Care Coordination  - Refer to psychosocial completed on 1/26/2023 for assessment/social functioning  - Chart review  - Team Meeting    - Pt to discharge tomorrow at 12pm. Writer updated pt's Mother about discharge and plans for outpatient care.  Dad will come to the unit to pick him up at 12pm on Thursday     - Group home wants all medications called into [facility] in [place]  Contact there is [facility] RN - Anthony ([phone]) Fax Orders to:  [phone]    AVS complete      You will be discharging to your Group [facility]: [facility] (Your Father is transporting you and will pick you up at 12pm on the unit)   [address] [place] [address]  [facility] : Dasia ([phone])  [facility] Nurse: Anthony ([phone])    - Outpatient Psychiatry Follow-up:  Appointment:  Friday, February 10th, @ 2:30pm w/ Dr. Greg Mensah (This is an in-person appointment)  [facility],   [address] [place] [address]  Phone: [phone]    - Outpatient Therapist Follow-up  Appointment: Monday, February 13th @ 10am with Matthew Emery (This is a phone appointment!!)  [facility]  Phone: [phone]    Other providers to follow-up with   Primary Care Provider: Dr. Lonny Wright, [facility] ([phone])  Mental Health : Chaitanya Cuevas at [facility] ([phone])  CADI : Nalini at [place] ([phone])

## 2023-02-08 NOTE — PROGRESS NOTES
"[facility] [place]   Psychiatric Progress Note  Hospital Day: 13        Interim History:   The patient's care was discussed with the treatment team during the daily team meeting and/or staff's chart notes were reviewed.  Staff report patient appeared calm and cooperative. He is withdrawn socially with a blunted affect but brightens on approach. He has consistently denied symptoms of psychosis. Consistently denying SI/SIB. Reporting that mood has improved. Sleeping and eating well. Independent with identifying needs and completing ADL's. No reported side effects. No episodes of agitation or aggression. No seclusion or restraints. Lithium level checked this AM and is at 0.8.     Upon interview: Tim reports that he is feeling better everyday. Again notes that his mood is improving, suicidal thoughts have resolved, and paranoia has improved. He denies command AH. No SI thoughts since Friday of last week. He is future oriented, looking forward to watching the Super Bowl with his parents this weekend. Dry mouth is much better since Cogentin was discontinued. He feels comfortable with plan to return to  setting tomorrow. He said that if SI thoughts returned, he would certainly seek help prior to acting on any SI thoughts. Discussed Li level results with patient.     Suicidal ideation: denies current SI plan or intent. Future oriented.     Homicidal ideation: denies current or recent homicidal ideation or behaviors.     Psychotic symptoms: Patient denies current AH and VH. He denies paranoia. Denies worries about \"hackers.\"    Medication side effects reported: No significant side effects other than those noted above.    Acute medical concerns: None. Denies lightheadedness/dizziness.     Other issues reported by patient: Patient had no further questions or concerns.           Medications:       diltiazem ER COATED BEADS  120 mg Oral Daily     fluticasone-vilanterol  1 puff Inhalation " "Daily     haloperidol  10 mg Oral At Bedtime     isosorbide mononitrate  30 mg Oral Daily     lamoTRIgine  300 mg Oral Daily     lithium ER  300 mg Oral Daily     lithium ER  600 mg Oral At Bedtime     loratadine  10 mg Oral Daily     metFORMIN  1,500 mg Oral Daily with breakfast     mirtazapine  15 mg Oral At Bedtime     propranolol  20 mg Oral TID          Allergies:     Allergies   Allergen Reactions     No Known Drug Allergies           Labs:     Recent Results (from the past 24 hour(s))   Lithium level    Collection Time: 02/07/23 11:15 AM   Result Value Ref Range    Lithium 0.7 0.6 - 1.2 mmol/L          Psychiatric Examination:     /74 (BP Location: Left arm)   Pulse 77   Temp 97.8  F (36.6  C) (Temporal)   Resp 16   Ht 1.88 m (6' 2\")   Wt 113.4 kg (250 lb)   SpO2 95%   BMI 32.10 kg/m    Weight is 250 lbs 0 oz  Body mass index is 32.1 kg/m .    Weight over time:  Vitals:    01/26/23 0357 01/31/23 0804   Weight: 113.4 kg (250 lb) 113.4 kg (250 lb)       Orthostatic Vitals     None        Cardiometabolic risk assessment. 01/27/23      Reviewed patient profile for cardiometabolic risk factors    Date taken /Value  REFERENCE RANGE   Abdominal Obesity  (Waist Circumference)   See nursing flowsheet Women ?35 in (88 cm)   Men ?40 in (102 cm)      Triglycerides  Triglycerides   Date Value Ref Range Status   02/21/2005 130 0 - 150 mg/dL Final       ?150 mg/dL (1.7 mmol/L) or current treatment for elevated triglycerides   HDL cholesterol  HDL Cholesterol   Date Value Ref Range Status   02/21/2005 42 >40 mg/dL Final   ]   Women <50 mg/dL (1.3 mmol/L) in women or current treatment for low HDL cholesterol  Men <40 mg/dL (1 mmol/L) in men or current treatment for low HDL cholesterol     Fasting plasma glucose (FPG) Lab Results   Component Value Date    GLC 94 01/24/2023    GLC 93 04/01/2016      FPG ?100 mg/dL (5.6 mmol/L) or treatment for elevated blood glucose   Blood pressure  BP Readings from Last 3 " "Encounters:   02/08/23 118/74   01/26/23 137/85   10/07/22 122/86    Blood pressure ?130/85 mmHg or treatment for elevated blood pressure   Family History  See family history     Appearance: awake, cooperative, lying in bed  Attitude: cooperative and calm  Eye Contact: good  Mood:  \"better\"  Affect: mood congruent and appropriate, constricted  Speech:  clear, coherent and flattened prosody  Language: fluent in English  Psychomotor Behavior:  No evidence of tardive dyskinesia, dystonia, or tics  Gait/Station: normal  Thought Process:  linear, logical, goal oriented  Associations:  no loose associations  Thought Content:  Denying SI/HI/AVH; no overt symptoms of psychosis  Insight:  fair  Judgement: fair  Oriented to:  person, and place  Attention Span and Concentration: intact  Recent and Remote Memory:  intact  Fund of Knowledge: appropriate     Clinical Global Impressions  First:7     Most recent:4           Precautions:     Behavioral Orders   Procedures     Code 1 - Restrict to Unit     Routine Programming     As clinically indicated     Status 15     Every 15 minutes.     Suicide precautions     Patients on Suicide Precautions should have a Combination Diet ordered that includes a Diet selection(s) AND a Behavioral Tray selection for Safe Tray - with utensils, or Safe Tray - NO utensils            Diagnoses:     F25.1 Schizoaffective disorder, depressed type  F41.1 Generalized anxiety disorder  F15.90 Stimulant use disorder, severe, dependence - by history  Prolonged QTc         Assessment & Plan:     Assessment and hospital summary:  This patient is a 47 year old  male with history of schizoaffective disorder, bipolar disorder and stimulant use disorder who presented to ED with paranoid ideation and delusions in context of medication non-adherence and increased psychosocial stressors. Patient was initially admitted to the [facility] unit, then transferred to station 12 due to paranoid delusion and severe " anxiety posing a safety risk. Symptoms and presentation at this time is most consistent with Schizoaffective disorder. I have discussed the risks, benefits, and alternatives of psychiatric hospitalization and medication treatment, including both Haldol and clozapine. Patient is amenable to a trial of haloperidol. He wishes to avoid antipsychotics with a high likelihood of weight gain. Patient will likely benefit from close psychiatric follow up upon discharge.      Inpatient psychiatric hospitalization is warranted at this time for safety, stabilization, and possible adjustment in medications.    Hospital course:  2/2/23: Reporting ongoing dry mouth.   2/3/23: Discontinue Cogentin due to side effect of dry mouth. PRN Cogentin remains available. Reduce Haldol to a total of 10 mg daily d/t dry mouth and day-time sedation. Discontinue Geodon due to suspected EPSE 2/2 Geodon and prolonged QTc.   2/8/23: Propranolol reduced to 10 mg TID as akathisia has resolved since discontinuation of Geodon and pressures have been soft. Hold parameters also added.       Target psychiatric symptoms and interventions:  Continue Haldol 10 mg QHS  Continue hydroxyzine 25 mg q4h prn for acute anxiety  Continue lamotrigine 300 mg qday  Continue lithium 300 mg qday and 600 mg qhs. Initiated in [facility] unit. Repeat Li level on 2/8/23 was therapeutic at 0.8.    Continue mirtazapine 15 mg at bedtime  Continue propranolol 20 mg tid     Risks, benefits, and alternatives discussed at length with patient.     Acute Medical Problems and Treatments:  Prolonged QTc: QTc 472 on 1/24/23 in context of taking Geodon total of 160 mg daily. Likely 2/2 Geodon. Will repeat EKG in AM.     Dry mouth: Discontinued Cogentin on 2/3 as it is most likely the culprit and added Biotene prn on 1/30. Encouraged fluids. Reduced Haldol slightly on 2/3 as well.     Monitor closely for evidence of EPSE. Cogentin PRN available.     Behavioral/Psychological/Social:  -  Encourage unit programming    Safety:  - Continue precautions as noted above  - Status 15 minute checks    Legal Status: voluntary    Disposition Plan   Reason for ongoing admission: is unable to care for self due to severe psychosis or song  Discharge location: group home  Discharge Medications: ORDERED  Follow-up Appointments: SCHEDULED    Entered by: Gala Enamorado MD on 2/8/2023 at 9:25 AM

## 2023-02-08 NOTE — PROGRESS NOTES
"  02/07/23 1700   Group Therapy Session   Time Session Began 1615   Time Session Ended 1715   Total Time (minutes) 53   Total # Attendees 8   Group Type expressive therapy   Group Topic Covered cognitive therapy techniques   Group Session Detail Opposite Action Emotions & Songs   Patient Response/Contribution cooperative with task   Patient Participation Detail Engaged in learning about the \"Opposite Action\" DBT skill, and songs to support changing mood through taking the opposite action when down or low-energy. Patients chose songs from a playlist designed to promote activation and personal affirmation.  Pt response was engaged, but more in passive observation than active participation and sharing.  \"Anthony\", as he introduced himself, politely passed each time MT invited him to participate verbally.        "

## 2023-02-09 VITALS
HEART RATE: 77 BPM | TEMPERATURE: 98 F | SYSTOLIC BLOOD PRESSURE: 118 MMHG | HEIGHT: 74 IN | RESPIRATION RATE: 16 BRPM | OXYGEN SATURATION: 96 % | BODY MASS INDEX: 32.08 KG/M2 | WEIGHT: 250 LBS | DIASTOLIC BLOOD PRESSURE: 84 MMHG

## 2023-02-09 PROCEDURE — 99239 HOSP IP/OBS DSCHRG MGMT >30: CPT | Performed by: PSYCHIATRY & NEUROLOGY

## 2023-02-09 PROCEDURE — 250N000013 HC RX MED GY IP 250 OP 250 PS 637: Performed by: PSYCHIATRY & NEUROLOGY

## 2023-02-09 PROCEDURE — 93010 ELECTROCARDIOGRAM REPORT: CPT | Performed by: INTERNAL MEDICINE

## 2023-02-09 RX ADMIN — LORATADINE 10 MG: 10 TABLET ORAL at 08:41

## 2023-02-09 RX ADMIN — FLUTICASONE FUROATE AND VILANTEROL TRIFENATATE 1 PUFF: 100; 25 POWDER RESPIRATORY (INHALATION) at 08:41

## 2023-02-09 RX ADMIN — METFORMIN HYDROCHLORIDE 1500 MG: 750 TABLET, EXTENDED RELEASE ORAL at 08:39

## 2023-02-09 RX ADMIN — DILTIAZEM HYDROCHLORIDE 120 MG: 120 CAPSULE, COATED, EXTENDED RELEASE ORAL at 08:39

## 2023-02-09 RX ADMIN — LAMOTRIGINE 300 MG: 150 TABLET ORAL at 08:39

## 2023-02-09 RX ADMIN — PROPRANOLOL HYDROCHLORIDE 10 MG: 10 TABLET ORAL at 08:39

## 2023-02-09 RX ADMIN — ISOSORBIDE MONONITRATE 30 MG: 30 TABLET, EXTENDED RELEASE ORAL at 08:39

## 2023-02-09 ASSESSMENT — ACTIVITIES OF DAILY LIVING (ADL)
HYGIENE/GROOMING: INDEPENDENT
ORAL_HYGIENE: INDEPENDENT
ADLS_ACUITY_SCORE: 30
LAUNDRY: UNABLE TO COMPLETE
ADLS_ACUITY_SCORE: 30
ADLS_ACUITY_SCORE: 40
DRESS: INDEPENDENT
ADLS_ACUITY_SCORE: 40

## 2023-02-09 NOTE — PLAN OF CARE
Discharge Note:    Recent Vitals: B/P: 118/84, T: 98, P: 77, R: 16     Patient discharged around 1230 to home with his father. Staff walked him off the unit to where his father was sitting in the waiting area for him. Staff ensured family safety exited the hospital. Patient left in his personal clothing. Staff went over patient's belongings and all items were present. Writer discussed patient's AVS and medications with him, he voiced having an understanding. Patient understands he will  his medications from an outside pharmacy.  He presented as calm, cooperative, and bright upon discharge. Patient denied SI/HI/AVH/SIB.    Kizzy Morris, RN MSN

## 2023-02-09 NOTE — DISCHARGE SUMMARY
"Psychiatric Discharge Summary    Tim Kelley MRN# [id number]   Age: 47 year old YOB: 1975     Date of Admission:  1/26/2023  Date of Discharge:  2/9/2023 12:30 PM   Admitting Physician:  Sabino Ford MD  Discharge Physician:  Gala Enamorado MD         Event Leading to Hospitalization:   Patient was interviewed in his room while supine on bed. Anthony reports things were going well at his group home until 2 days before admission he began to experience \"energy coming out of my body that hurt\". He notes feeling similar pain previously which resolved during his last hospitalization but returned 4 days after discharge.  This pain gradually worsened while at the group home and he describes it as \"hot, tinging, and uncomfortable\" throughout all surfaces of his body. He is not sure what is causing the pain and reports good adherence to medications with observed and monitored administration at the group home. Anthony reports previously self discontinuing risperidone prior to his previous hospitalization for 40-50 lbs of weight gain.  He denies significant stressors or recent changes in his life or living situation. He denies illicit substance use or alcohol use. Anthony reports feeling unsafe due to \"the energy may kill me\" and reports his mood as \"scared and terrified\". He is open to medication trials but expresses \"you can't help me but I will try\". He reports sleeping \"a few hours\" each night, nightly nightmares and feel fatigued. Anthony endorses mild nausea and eating and drinking less than usual due to reduced appetite. His last BM was this morning, described as hard but declined offer for laxative. He denies other health concerns or suicidal ideation.        See Admission note by Gala Enamorado MD on 1/26/23 for additional details.          Diagnoses:   F25.1 Schizoaffective disorder, depressed type  F41.1 Generalized anxiety disorder  F15.90 Stimulant use disorder, severe, " dependence - by history  Prolonged QTc         Labs:   Lithium level: 0.8 (2/8/2023)  Creatinine: 1.1 (1/30/2023)  UDS: Positive for amphetamines (1/24/2023)  CBC, T4, CMP: Unremarkable  TSH: Elevated to 4.4 (1/24/2023)  COVID-19: Negative       Consults:   No consultations were requested during this admission         Hospital Course:   Tim Kelley was admitted to [facility] with attending Gala Enamorado MD as a voluntary patient. The patient was placed under status 15 (15 minute checks) to ensure patient safety.   CBC, BMP and utox obtained.  All outpatient medications were continued and haloperidol 5 mg qhs + benztropine 1 mg qhs was initiated.    2/2/23: Reporting ongoing dry mouth.   2/3/23: Discontinue Cogentin due to side effect of dry mouth. PRN Cogentin remains available. Reduce Haldol to a total of 10 mg daily d/t dry mouth and day-time sedation. Discontinue Geodon due to suspected EPSE 2/2 Geodon and prolonged QTc.   2/8/23: Propranolol reduced to 10 mg TID as akathisia has resolved since discontinuation of Geodon and pressures have been soft. Hold parameters also added.   2/9/23: Patient reports psychosis symptoms are resolved with minor lingering body pain. He is adherent to medication and treatment recommendations. He denies SI/HI/AVH or paranoia.  He articulates an appropriate safety plan and agreed to attend outpatient psychiatric follow up and psychotherapy follow up after discharge.      Tim Kelley did participate in groups and was visible in the milieu.     The patient's symptoms of psychosis improved.     Tim Kelley was released to home. At the time of discharge Tim Kelley was determined to not be a danger to himself or others.     RISK ASSESSMENT:  Today Tim Kelley denies SI, SIB, and HI. No overt evidence of psychosis or song observed. Patient grossly appears to be cognitively intact. Patient has not  exhibited any aggressive or violent behaviors throughout his hospital stay. He has notable risk factors for self-harm including recent psych inpt stay and male.  However, risk is mitigated by no h/o suicide attempt, no plan or intent, no h/o risky impulsive behavior, no access to lethal means, describes a safety plan, h/o seeking help when needed, symptom improvement, future oriented, feeling hopeful, none to minimal alcohol use , commitment to family, good social support   and stable housing. Patient does not have access to firearms. Based on all available evidence he does not appear to be at imminent risk for self-harm therefore does not meet criteria for a 72-hr hold/ involuntary hospitalization.  However, based on degree of symptoms therapy and close psych FU was recommended which the pt did agree to. Patient also agreed to call 911/present to ED if any imminent safety concerns arise, including re-emergence of SI. Patient was provided with crisis resources at the time of discharge. Patient agreed to further reduce risk of self-harm by completely abstaining from illicit substances and alcohol, and agreed to remain medication adherent. Expressed understanding of the risks associated with excessive alcohol use, illicit substance use, and medication/treatment non-adherence, including increased risk of harm to self or others.           Discharge Medications:     Discharge Medication List as of 2/9/2023 11:55 AM      START taking these medications    Details   artificial saliva (BIOTENE MT) SOLN solution Swish and spit 2 mLs (2 sprays) in mouth 4 times daily as needed for dry mouth, Disp-44.3 mL, R-0, E-Prescribe      benztropine (COGENTIN) 1 MG tablet Take 1 tablet (1 mg) by mouth 2 times daily as needed for movement disorders, Disp-60 tablet, R-0, E-Prescribe      haloperidol (HALDOL) 10 MG tablet Take 1 tablet (10 mg) by mouth At Bedtime, Disp-30 tablet, R-0, E-Prescribe      lithium (ESKALITH CR/LITHOBID) 450 MG  CR tablet Take 2 tablets (900 mg) by mouth At Bedtime, Disp-60 tablet, R-0, E-Prescribe      !! melatonin 3 MG tablet Take 1 tablet (3 mg) by mouth nightly as needed for sleep, Disp-30 tablet, R-0, E-Prescribe      polyethylene glycol (MIRALAX) 17 GM/Dose powder Take 17 g by mouth daily, Disp-510 g, R-0, E-Prescribe       !! - Potential duplicate medications found. Please discuss with provider.      CONTINUE these medications which have CHANGED    Details   albuterol (PROAIR HFA/PROVENTIL HFA/VENTOLIN HFA) 108 (90 Base) MCG/ACT inhaler Inhale 1-2 puffs into the lungs every 4 hours as needed for shortness of breath or wheezing, Disp-18 g, R-0, E-PrescribePharmacy may dispense brand covered by insurance (Proair, or proventil or ventolin or generic albuterol inhaler)      BREO ELLIPTA 100-25 MCG/ACT inhaler Inhale 1 puff into the lungs daily, Disp-28 each, R-0, ADELITA, E-Prescribe      diltiazem ER (DILT-XR) 120 MG 24 hr capsule Take 1 capsule (120 mg) by mouth daily, Disp-30 capsule, R-0, E-Prescribe      hydrOXYzine (ATARAX) 25 MG tablet Take 1 tablet (25 mg) by mouth 3 times daily as needed for anxiety, Disp-90 tablet, R-0, E-Prescribe      isosorbide mononitrate (IMDUR) 30 MG 24 hr tablet Take 1 tablet (30 mg) by mouth daily, Disp-30 tablet, R-0, E-Prescribe      lamoTRIgine (LAMICTAL) 150 MG tablet Take 2 tablets (300 mg) by mouth every morning, Disp-60 tablet, R-0, E-Prescribe      loratadine 10 MG capsule Take 10 mg by mouth daily, Disp-30 capsule, R-0, E-Prescribe      metFORMIN (GLUCOPHAGE XR) 500 MG 24 hr tablet Take 3 tablets (1,500 mg) by mouth every morning, Disp-90 tablet, R-0, E-Prescribe      mirtazapine (REMERON) 15 MG tablet Take 1 tablet (15 mg) by mouth At Bedtime, Disp-30 tablet, R-0, E-Prescribe      propranolol (INDERAL) 10 MG tablet Take 1 tablet (10 mg) by mouth 3 times daily, Disp-90 tablet, R-0, E-Prescribe         CONTINUE these medications which have NOT CHANGED    Details   finasteride  "(PROSCAR) 5 MG tablet Take 1.25 mg by mouth daily, Historical      lidocaine (LIDODERM) 5 % patch Place 1 patch onto the skin dailyHistorical      !! melatonin 3 MG tablet Take 3 mg by mouth nightly as needed for sleep, Historical      Multiple Vitamin (ONE-A-DAY ESSENTIAL) TABS Take 1 tablet by mouth daily, Historical       !! - Potential duplicate medications found. Please discuss with provider.      STOP taking these medications       lithium 300 MG capsule Comments:   Reason for Stopping:         ziprasidone (GEODON) 80 MG capsule Comments:   Reason for Stopping:                    Psychiatric Examination:   Appearance:  awake, alert, dressed in hospital scrubs, cooperative, no apparent distress and slightly unkempt  Attitude:  cooperative  Eye Contact:  good  Mood:  \"pretty good\"  Affect:  appropriate and in normal range  Speech:  clear, coherent  Psychomotor Behavior:  no evidence of tardive dyskinesia, dystonia, or tics  Thought Process:  logical, linear and goal oriented  Associations:  no loose associations  Thought Content:  no evidence of suicidal ideation or homicidal ideation and endorses mild body pain attributed to previous psychosis symptoms  Insight:  fair  Judgment:  fair  Oriented to:  time, person, and place  Attention Span and Concentration:  intact  Recent and Remote Memory:  intact  Fund of Knowledge: appropriate  Muscle Strength and Tone: normal  Gait and Station: Normal         Discharge Plan:   Psychiatric Appointments: 2/10/23 at [facility] with Dr. Greg Mensah, in person appointment  Psychotherapy Appointments: 2/13/23 at [facility] with Matthew Emery, phone appointment  Other Referrals:  Primary Care Provider: Dr. Lonny Wright, [facility] ([phone])  Mental Health : Chaitanya Cuevas at [facility] ([phone])  CAD : [facility] at [place] ([phone])    Attestation:  The patient " has been seen and evaluated by me,  Saurabh Watters MD     >50 minutes total time that was spent and over 50% of this time was spent in counseling and coordination of care with staff, reviewing medical record, educating patient about treatment options, side effects and benefits and alternative treatments for medications, providing supportive therapy and redirection regarding above symptoms.     This document is created with the help of Dragon dictation system.  All grammatical/typing errors or context distortion are unintentional and inherent to software.

## 2023-02-09 NOTE — PLAN OF CARE
Problem: Sleep Disturbance  Goal: Adequate Sleep/Rest  Outcome: Progressing    Patient slept approximately 7 hours during the night shift, appeared comfortable with unlabored breathing patterns. Continues safety checks every 15 minutes.

## 2023-02-09 NOTE — PLAN OF CARE
Problem: Depressive Signs/Symptoms  Goal: Enhanced Social, Occupational or Functional Skills (Depressive Signs/Symptoms)  Outcome: Progressing     Problem: Suicidal Behavior  Goal: Suicidal Behavior is Absent or Managed  Outcome: Progressing  Flowsheets (Taken 2/8/2023 2052)  Mutually Determined Action Steps (Suicidal Behavior Absent/Managed): sets future-oriented goal   Goal Outcome Evaluation:    Plan of Care Reviewed With: patient          Patient remains withdrawn and isolative to room majority of the evening, only up with prompting for meals and ADL's. Patient upon approach flat in affect, calm and cooperative with verbal assessment. Patient reporting relief with discharge tomorrow though stating some anxiety with anticipation for discharge. Patient stating he feels safe returning home and denies any paranoid thoughts or thoughts of suicide. Patient stating that he felt tired today but did report and appeared to be brighter in affect after showering. Patient diet appeared good eating 100% of dinner tray. Patient cooperative with vital sign assessments which appeared to have stabilized from the low blood pressures seen during the day. Patient accepting of HS medications with no stated or observed side effects.

## 2023-02-10 LAB
ATRIAL RATE - MUSE: 65 BPM
DIASTOLIC BLOOD PRESSURE - MUSE: NORMAL MMHG
INTERPRETATION ECG - MUSE: NORMAL
P AXIS - MUSE: 58 DEGREES
PR INTERVAL - MUSE: 172 MS
QRS DURATION - MUSE: 98 MS
QT - MUSE: 448 MS
QTC - MUSE: 465 MS
R AXIS - MUSE: 3 DEGREES
SYSTOLIC BLOOD PRESSURE - MUSE: NORMAL MMHG
T AXIS - MUSE: 30 DEGREES
VENTRICULAR RATE- MUSE: 65 BPM

## 2023-06-01 ENCOUNTER — HEALTH MAINTENANCE LETTER (OUTPATIENT)
Age: 48
End: 2023-06-01

## 2024-08-10 ENCOUNTER — HEALTH MAINTENANCE LETTER (OUTPATIENT)
Age: 49
End: 2024-08-10

## 2025-08-05 ENCOUNTER — OFFICE VISIT (OUTPATIENT)
Dept: FAMILY MEDICINE | Facility: CLINIC | Age: 50
End: 2025-08-05
Payer: MEDICARE

## 2025-08-05 VITALS
HEART RATE: 84 BPM | HEIGHT: 74 IN | OXYGEN SATURATION: 96 % | RESPIRATION RATE: 20 BRPM | TEMPERATURE: 97.7 F | SYSTOLIC BLOOD PRESSURE: 117 MMHG | WEIGHT: 244.5 LBS | DIASTOLIC BLOOD PRESSURE: 81 MMHG | BODY MASS INDEX: 31.38 KG/M2

## 2025-08-05 DIAGNOSIS — L72.3 SEBACEOUS CYST: Primary | ICD-10-CM

## 2025-08-05 DIAGNOSIS — F33.9 RECURRENT MAJOR DEPRESSIVE DISORDER, REMISSION STATUS UNSPECIFIED: ICD-10-CM

## 2025-08-05 PROCEDURE — 3074F SYST BP LT 130 MM HG: CPT | Performed by: PHYSICIAN ASSISTANT

## 2025-08-05 PROCEDURE — 99203 OFFICE O/P NEW LOW 30 MIN: CPT | Performed by: PHYSICIAN ASSISTANT

## 2025-08-05 PROCEDURE — 1126F AMNT PAIN NOTED NONE PRSNT: CPT | Performed by: PHYSICIAN ASSISTANT

## 2025-08-05 PROCEDURE — 96127 BRIEF EMOTIONAL/BEHAV ASSMT: CPT | Performed by: PHYSICIAN ASSISTANT

## 2025-08-05 PROCEDURE — 3079F DIAST BP 80-89 MM HG: CPT | Performed by: PHYSICIAN ASSISTANT

## 2025-08-05 ASSESSMENT — PAIN SCALES - GENERAL: PAINLEVEL_OUTOF10: NO PAIN (0)

## 2025-08-05 ASSESSMENT — ASTHMA QUESTIONNAIRES
QUESTION_1 LAST FOUR WEEKS HOW MUCH OF THE TIME DID YOUR ASTHMA KEEP YOU FROM GETTING AS MUCH DONE AT WORK, SCHOOL OR AT HOME: MOST OF THE TIME
QUESTION_5 LAST FOUR WEEKS HOW WOULD YOU RATE YOUR ASTHMA CONTROL: NOT CONTROLLED AT ALL
QUESTION_4 LAST FOUR WEEKS HOW OFTEN HAVE YOU USED YOUR RESCUE INHALER OR NEBULIZER MEDICATION (SUCH AS ALBUTEROL): ONE OR TWO TIMES PER DAY
QUESTION_3 LAST FOUR WEEKS HOW OFTEN DID YOUR ASTHMA SYMPTOMS (WHEEZING, COUGHING, SHORTNESS OF BREATH, CHEST TIGHTNESS OR PAIN) WAKE YOU UP AT NIGHT OR EARLIER THAN USUAL IN THE MORNING: FOUR OR MORE NIGHTS A WEEK
ACT_TOTALSCORE: 8
EMERGENCY_ROOM_LAST_YEAR_TOTAL: TWO
QUESTION_2 LAST FOUR WEEKS HOW OFTEN HAVE YOU HAD SHORTNESS OF BREATH: ONCE A DAY

## 2025-08-05 ASSESSMENT — PATIENT HEALTH QUESTIONNAIRE - PHQ9
SUM OF ALL RESPONSES TO PHQ QUESTIONS 1-9: 11
10. IF YOU CHECKED OFF ANY PROBLEMS, HOW DIFFICULT HAVE THESE PROBLEMS MADE IT FOR YOU TO DO YOUR WORK, TAKE CARE OF THINGS AT HOME, OR GET ALONG WITH OTHER PEOPLE: VERY DIFFICULT
SUM OF ALL RESPONSES TO PHQ QUESTIONS 1-9: 11

## 2025-08-06 ENCOUNTER — PATIENT OUTREACH (OUTPATIENT)
Dept: CARE COORDINATION | Facility: CLINIC | Age: 50
End: 2025-08-06
Payer: MEDICARE

## 2025-08-16 ENCOUNTER — HEALTH MAINTENANCE LETTER (OUTPATIENT)
Age: 50
End: 2025-08-16